# Patient Record
Sex: FEMALE | Race: ASIAN | NOT HISPANIC OR LATINO
[De-identification: names, ages, dates, MRNs, and addresses within clinical notes are randomized per-mention and may not be internally consistent; named-entity substitution may affect disease eponyms.]

---

## 2022-05-23 ENCOUNTER — APPOINTMENT (OUTPATIENT)
Dept: ENDOCRINOLOGY | Facility: CLINIC | Age: 47
End: 2022-05-23
Payer: COMMERCIAL

## 2022-05-23 VITALS
HEART RATE: 104 BPM | BODY MASS INDEX: 24.11 KG/M2 | HEIGHT: 62 IN | DIASTOLIC BLOOD PRESSURE: 71 MMHG | WEIGHT: 131 LBS | SYSTOLIC BLOOD PRESSURE: 119 MMHG

## 2022-05-23 DIAGNOSIS — R53.83 OTHER FATIGUE: ICD-10-CM

## 2022-05-23 DIAGNOSIS — R55 SYNCOPE AND COLLAPSE: ICD-10-CM

## 2022-05-23 PROCEDURE — 99215 OFFICE O/P EST HI 40 MIN: CPT

## 2022-05-25 NOTE — PHYSICAL EXAM
[Alert] : alert [Normal Sclera/Conjunctiva] : normal sclera/conjunctiva [Normal Outer Ear/Nose] : the ears and nose were normal in appearance [No Neck Mass] : no neck mass was observed [No Respiratory Distress] : no respiratory distress [Normal Rate] : heart rate was normal [Regular Rhythm] : with a regular rhythm [No Edema] : no peripheral edema [Spine Straight] : spine straight [No Stigmata of Cushings Syndrome] : no stigmata of Cushings Syndrome [Normal Gait] : normal gait [Normal Reflexes] : deep tendon reflexes were 2+ and symmetric [No Tremors] : no tremors [Oriented x3] : oriented to person, place, and time [Acanthosis Nigricans] : no acanthosis nigricans [de-identified] : \par Big mass palpated in the L lobe, 2.8 cm. No LN enlargement.

## 2022-05-25 NOTE — HISTORY OF PRESENT ILLNESS
[FreeTextEntry1] : Patient is a 47 year y/o F with history of thyroid nodule noted on 10/06/21, presents today to establish endocrine care.\par \par Pertinent medical history: none\par FMH: No family history of thyroid cancer, or thyroid cancer syndromes ([MEN2], familial adenomatous polyposis, or Cowden syndrome). \par \par Other PMHx: DM, Migraine, Peptic ulcer disease, anemia, stroke or TIA\par PSHx: Appendectomy, Oophorectomy (one removed)\par FHx: Mother: lump in throat, DM, bleeding disorder, Ovarian CA. Father: DM, bleeding disorder, Glaucoma, Kidney disease. Grandmother (maternal): thyroid condition (most likely hypothyroidism). Has 2 siblings in good health. No FHx of: thyroid nodules/CA. \par SHx: Never smoker. No EtOH use. No recreational drug use. 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 \par Allergic to: Opioids (vomiting and malaise)\par LMP: 2 weeks ago (noted 05/23/2022). \par \par 05/23/2022\par Pt is accompanied by her friend for thyroid evaluation. \par Thyroid nodule was noted on imaging studies workup after pt lost consciousness at work on 10/06/21. So far, pt was informed she had TIA . She never had her thyroid checked by an endocrinologist due to delay in insurance (insurance was approved 2 weeks ago).\par \par As per pt, she completed a CT scan of the nodule at [facility] (no available imaging at present). \par \par Today, pt feels tired, onset 2021. She endorses joint pains and chronic diarrhea 3-4 times a week since childhood (pt was diagnosed with peptic ulcer for diarrhea). Pt also experiences occasional heat intolerance. \par Denies obstructive respiratory symptoms, nocturia, hair loss, or acne. \par \par Current Medication: ASA 81 mg (ER due to TIA), Pantoprazole 40 mg prn, Tylenol 500 mg prn, Cetirizine 30 mg prn, Pseudoephedrine 30 mg prn (for congestion). \par

## 2022-05-25 NOTE — REVIEW OF SYSTEMS
[Negative] : Heme/Lymph [Fatigue] : fatigue [Diarrhea] : diarrhea [Heat Intolerance] : heat intolerance [As Noted in HPI] : as noted in HPI [Joint Pain] : joint pain [Dysphagia] : no dysphagia [Dysphonia] : no dysphonia [Difficulty Breathing] : no dyspnea [Nocturia] : no nocturia [Acne] : no acne [Hair Loss] : no hair loss

## 2022-05-25 NOTE — END OF VISIT
[FreeTextEntry3] : All medical record entries made by the Scribe were at my, Dr. Franki Wynn, direction and personally dictated by me on 05/23/2022. I have reviewed the chart and agree that the record accurately reflects my personal performance of the history, physical exam, assessment and plan. I have also personally directed, reviewed and agreed with the chart.  [Time Spent: ___ minutes] : I have spent [unfilled] minutes of time on the encounter.

## 2022-05-25 NOTE — ADDENDUM
[FreeTextEntry1] : I Carly Jossue act soley as a scribe for Dr. Franki Wynn on this date. 05/23/2022

## 2022-05-25 NOTE — ASSESSMENT
[FreeTextEntry1] : 47 year y/o F pt with:\par \par 1. L Thyroid nodule noted on imaging studies workup after fainting episode at work on 10/2021:\par Pt denies obstructive respiratory symptoms. \par No signs of hyper or hypothyroidism. \par No family history of thyroid nodules/CA. \par Order TSH and thyroid US.\par \par 2. Syncope episodes found on 10/2021:\par Pt was admitted and was told she might have TIA/stroke. \par Workup has not been completed because she did not have insurance at the times. \par Complains of feeling extremely tired, onset 1 year. \par Refer to PCP. \par \par Return in: 4 weeks. \par

## 2022-08-10 ENCOUNTER — APPOINTMENT (OUTPATIENT)
Dept: INTERNAL MEDICINE | Facility: CLINIC | Age: 47
End: 2022-08-10

## 2022-08-10 ENCOUNTER — LABORATORY RESULT (OUTPATIENT)
Age: 47
End: 2022-08-10

## 2022-08-10 VITALS
SYSTOLIC BLOOD PRESSURE: 113 MMHG | BODY MASS INDEX: 24.29 KG/M2 | HEIGHT: 62 IN | DIASTOLIC BLOOD PRESSURE: 69 MMHG | OXYGEN SATURATION: 97 % | TEMPERATURE: 98.7 F | WEIGHT: 132 LBS | HEART RATE: 108 BPM

## 2022-08-10 DIAGNOSIS — G47.00 INSOMNIA, UNSPECIFIED: ICD-10-CM

## 2022-08-10 DIAGNOSIS — Z86.73 PERSONAL HISTORY OF TRANSIENT ISCHEMIC ATTACK (TIA), AND CEREBRAL INFARCTION W/OUT RESIDUAL DEFICITS: ICD-10-CM

## 2022-08-10 PROCEDURE — 36415 COLL VENOUS BLD VENIPUNCTURE: CPT

## 2022-08-10 PROCEDURE — 99386 PREV VISIT NEW AGE 40-64: CPT | Mod: 25

## 2022-08-10 RX ORDER — MONTELUKAST 10 MG/1
10 TABLET, FILM COATED ORAL
Qty: 30 | Refills: 0 | Status: COMPLETED | COMMUNITY
Start: 2022-07-13 | End: 2022-08-10

## 2022-08-10 RX ORDER — LEVOFLOXACIN 250 MG/1
250 TABLET, FILM COATED ORAL
Qty: 20 | Refills: 0 | Status: COMPLETED | COMMUNITY
Start: 2022-04-27

## 2022-08-10 NOTE — HISTORY OF PRESENT ILLNESS
[FreeTextEntry1] : annual [de-identified] : acid reflux\par - has had a few EGDs in the past\par - has tried multiple OTC antacids\par - only PPI works well\par \par TIA 10/2021\par - follows w/ outside neuro\par - had MRI w/ and w/o last week; pending results.\par \par Thyroid nodule\par - follows w/ endo\par - needs labs drawn today\par \par ride sided low back pain\par - radiates down leg\par - follows w/ acupuncturist\par - has never had xray images\par - improves w/ gentle stretching. \par \par HCM\par - never had mammo\par - flu and covid vax up to date.\par - needs new GYN

## 2022-08-15 ENCOUNTER — TRANSCRIPTION ENCOUNTER (OUTPATIENT)
Age: 47
End: 2022-08-15

## 2022-08-15 LAB
25(OH)D3 SERPL-MCNC: 15.2 NG/ML
ALBUMIN SERPL ELPH-MCNC: 4.3 G/DL
ALP BLD-CCNC: 77 U/L
ALT SERPL-CCNC: 15 U/L
ANION GAP SERPL CALC-SCNC: 14 MMOL/L
APPEARANCE: CLEAR
AST SERPL-CCNC: 23 U/L
BASOPHILS # BLD AUTO: 0.08 K/UL
BASOPHILS NFR BLD AUTO: 1.3 %
BILIRUB SERPL-MCNC: 0.3 MG/DL
BILIRUBIN URINE: NEGATIVE
BLOOD URINE: NORMAL
BUN SERPL-MCNC: 9 MG/DL
CALCIUM SERPL-MCNC: 9.9 MG/DL
CHLORIDE SERPL-SCNC: 101 MMOL/L
CHOLEST SERPL-MCNC: 153 MG/DL
CO2 SERPL-SCNC: 22 MMOL/L
COLOR: NORMAL
CREAT SERPL-MCNC: 0.62 MG/DL
EGFR: 112 ML/MIN/1.73M2
EOSINOPHIL # BLD AUTO: 0.25 K/UL
EOSINOPHIL NFR BLD AUTO: 4.1 %
ESTIMATED AVERAGE GLUCOSE: 117 MG/DL
GLUCOSE QUALITATIVE U: NEGATIVE
GLUCOSE SERPL-MCNC: 94 MG/DL
HBA1C MFR BLD HPLC: 5.7 %
HCT VFR BLD CALC: 42 %
HCV AB SER QL: NONREACTIVE
HCV S/CO RATIO: 0.09 S/CO
HDLC SERPL-MCNC: 57 MG/DL
HGB BLD-MCNC: 13.3 G/DL
IMM GRANULOCYTES NFR BLD AUTO: 0.2 %
KETONES URINE: NEGATIVE
LDLC SERPL CALC-MCNC: 67 MG/DL
LEUKOCYTE ESTERASE URINE: NEGATIVE
LYMPHOCYTES # BLD AUTO: 1.76 K/UL
LYMPHOCYTES NFR BLD AUTO: 29.2 %
MAN DIFF?: NORMAL
MCHC RBC-ENTMCNC: 26.4 PG
MCHC RBC-ENTMCNC: 31.7 GM/DL
MCV RBC AUTO: 83.3 FL
MONOCYTES # BLD AUTO: 0.49 K/UL
MONOCYTES NFR BLD AUTO: 8.1 %
NEUTROPHILS # BLD AUTO: 3.44 K/UL
NEUTROPHILS NFR BLD AUTO: 57.1 %
NITRITE URINE: NEGATIVE
NONHDLC SERPL-MCNC: 96 MG/DL
PH URINE: 7.5
PLATELET # BLD AUTO: 271 K/UL
POTASSIUM SERPL-SCNC: 4.4 MMOL/L
PROT SERPL-MCNC: 7.3 G/DL
PROTEIN URINE: NEGATIVE
RBC # BLD: 5.04 M/UL
RBC # FLD: 14.3 %
SODIUM SERPL-SCNC: 137 MMOL/L
SPECIFIC GRAVITY URINE: 1.01
T4 FREE SERPL-MCNC: 1.2 NG/DL
THYROGLOB AB SERPL-ACNC: <20 IU/ML
THYROPEROXIDASE AB SERPL IA-ACNC: <10 IU/ML
TRIGL SERPL-MCNC: 145 MG/DL
TSH SERPL-ACNC: 2.23 UIU/ML
UROBILINOGEN URINE: NORMAL
WBC # FLD AUTO: 6.03 K/UL

## 2022-08-16 ENCOUNTER — TRANSCRIPTION ENCOUNTER (OUTPATIENT)
Age: 47
End: 2022-08-16

## 2022-08-18 ENCOUNTER — APPOINTMENT (OUTPATIENT)
Dept: ENDOCRINOLOGY | Facility: CLINIC | Age: 47
End: 2022-08-18

## 2022-08-18 VITALS
SYSTOLIC BLOOD PRESSURE: 120 MMHG | HEART RATE: 104 BPM | BODY MASS INDEX: 24.51 KG/M2 | WEIGHT: 134 LBS | DIASTOLIC BLOOD PRESSURE: 75 MMHG

## 2022-08-18 PROCEDURE — 99213 OFFICE O/P EST LOW 20 MIN: CPT

## 2022-08-19 NOTE — HISTORY OF PRESENT ILLNESS
[FreeTextEntry1] : Patient is a 47 year y/o F with history of thyroid nodule noted on 10/06/21, presents today to establish endocrine care.\par \par Pertinent medical history: none\par FMH: No family history of thyroid cancer, or thyroid cancer syndromes ([MEN2], familial adenomatous polyposis, or Cowden syndrome). \par \par Other PMHx: DM, Migraine, Peptic ulcer disease, anemia, stroke or TIA\par PSHx: Appendectomy, Oophorectomy (one removed)\par FHx: Mother: lump in throat, DM, bleeding disorder, Ovarian CA. Father: DM, bleeding disorder, Glaucoma, Kidney disease. Grandmother (maternal): thyroid condition (most likely hypothyroidism). Has 2 siblings in good health. No FHx of: thyroid nodules/CA. \par SHx: Never smoker. No EtOH use. No recreational drug use. 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 \par Allergic to: Opioids (vomiting and malaise)\par LMP: 2 weeks ago (noted 05/23/2022). \par \par 05/23/2022\par Pt is accompanied by her friend for thyroid evaluation. \par Thyroid nodule was noted on imaging studies workup after pt lost consciousness at work on 10/06/21. So far, pt was informed she had TIA . She never had her thyroid checked by an endocrinologist due to delay in insurance (insurance was approved 2 weeks ago).\par \par As per pt, she completed a CT scan of the nodule at [facility] (no available imaging at present). \par \par Today, pt feels tired, onset 2021. She endorses joint pains and chronic diarrhea 3-4 times a week since childhood (pt was diagnosed with peptic ulcer for diarrhea). Pt also experiences occasional heat intolerance. \par Denies obstructive respiratory symptoms, nocturia, hair loss, or acne. \par \par 08/18/2022\par Pt presents today for thyroid f/u, she is clinically unchanged from last visit. She sees her neurologist for c/o dizziness which occurs when she moves very fast. She sees her PCP regularly.   \par Pt was explained her thyroid labs and US.  \par She is not taking any medications.  \par \par Current Medication: ASA 81 mg (ER due to TIA), Pantoprazole 40 mg prn, Tylenol 500 mg prn, Cetirizine 30 mg prn, Pseudoephedrine 30 mg prn (for congestion). \par

## 2022-08-19 NOTE — ADDENDUM
[FreeTextEntry1] : I, Katiuska Ernst, acted solely as a scribe for Dr. Franki Wynn on this date 08/18/2022  [Follow-Up Visit] : a follow-up [FreeTextEntry2] : MPD/thrombocytosis, breast cancer

## 2022-08-19 NOTE — DATA REVIEWED
[FreeTextEntry1] : Imaging: \par \par Labs: \par - 08/10/22: A1c 5.7%, s.creat 0.62, Ca 9.9, LDL-c 67, TSHx 2.23, Free T4 1.2, Vitamin D 15.2 (L), TPA <10, Thyro Ab <20 \par

## 2022-08-19 NOTE — REVIEW OF SYSTEMS
[As Noted in HPI] : as noted in HPI [Dizziness] : dizziness [Negative] : Endocrine [Difficulty Breathing] : no dyspnea [Nocturia] : no nocturia Referred To Plastics For Closure Text (Leave Blank If You Do Not Want): After obtaining clear surgical margins the patient was sent to plastics for surgical repair.  The patient understands they will receive post-surgical care and follow-up from the referring physician's office.

## 2022-08-19 NOTE — END OF VISIT
[FreeTextEntry3] : All medical record entries made by the Scribe were at my, Dr. Franki Wynn, direction and personally dictated by me on 08/18/2022. I have reviewed the chart and agree that the record accurately reflects my personal performance of the history, physical exam, assessment and plan. I have also personally, directed, reviewed and agreed with the chart  [Time Spent: ___ minutes] : I have spent [unfilled] minutes of time on the encounter.

## 2022-08-19 NOTE — PHYSICAL EXAM
[Alert] : alert [Normal Sclera/Conjunctiva] : normal sclera/conjunctiva [Normal Outer Ear/Nose] : the ears and nose were normal in appearance [No Neck Mass] : no neck mass was observed [No Respiratory Distress] : no respiratory distress [Normal Rate] : heart rate was normal [Regular Rhythm] : with a regular rhythm [No Edema] : no peripheral edema [Spine Straight] : spine straight [No Stigmata of Cushings Syndrome] : no stigmata of Cushings Syndrome [Normal Gait] : normal gait [Normal Reflexes] : deep tendon reflexes were 2+ and symmetric [No Tremors] : no tremors [Oriented x3] : oriented to person, place, and time [Acanthosis Nigricans] : no acanthosis nigricans [de-identified] : palpapble nodule 4 cm in L side that is soft and moblile.  [de-identified] : Palpable nodule 4 cm in L side that is soft and mobile. \par Big mass palpated in the L lobe, 2.8 cm. No LN enlargement.

## 2022-08-19 NOTE — ASSESSMENT
[FreeTextEntry1] : 47 year y/o F pt with:\par \par 1. L Thyroid nodule noted on imaging studies workup after fainting episode at work on 10/2021:\par No family history of thyroid Ca. Pt is asymptomatic with no obstructive symptoms. \par Thyroid US from 06/04/21: Right thyroid lobe: 2 subcentimeter cystic nodules, and a third hypoechoic nodule. \par                                            Left thyroid lobe: A mixed cyst solid module that measure 5.2 x 2.4 x 3.2 cm. \par \par Will repeat Thyroid US in 12/22. \par Pt will return in 12/22. \par \par

## 2022-09-13 ENCOUNTER — TRANSCRIPTION ENCOUNTER (OUTPATIENT)
Age: 47
End: 2022-09-13

## 2022-09-15 ENCOUNTER — APPOINTMENT (OUTPATIENT)
Dept: INTERNAL MEDICINE | Facility: CLINIC | Age: 47
End: 2022-09-15

## 2022-09-15 ENCOUNTER — EMERGENCY (EMERGENCY)
Facility: HOSPITAL | Age: 47
LOS: 1 days | Discharge: HOME | End: 2022-09-15
Attending: STUDENT IN AN ORGANIZED HEALTH CARE EDUCATION/TRAINING PROGRAM | Admitting: STUDENT IN AN ORGANIZED HEALTH CARE EDUCATION/TRAINING PROGRAM
Payer: COMMERCIAL

## 2022-09-15 VITALS
SYSTOLIC BLOOD PRESSURE: 100 MMHG | HEART RATE: 107 BPM | WEIGHT: 132 LBS | DIASTOLIC BLOOD PRESSURE: 64 MMHG | BODY MASS INDEX: 24.29 KG/M2 | HEIGHT: 62 IN | OXYGEN SATURATION: 97 % | TEMPERATURE: 98.6 F | RESPIRATION RATE: 16 BRPM

## 2022-09-15 VITALS
TEMPERATURE: 98 F | HEART RATE: 86 BPM | SYSTOLIC BLOOD PRESSURE: 116 MMHG | OXYGEN SATURATION: 100 % | WEIGHT: 134.04 LBS | DIASTOLIC BLOOD PRESSURE: 77 MMHG | RESPIRATION RATE: 16 BRPM | HEIGHT: 62 IN

## 2022-09-15 DIAGNOSIS — M54.41 LUMBAGO WITH SCIATICA, RIGHT SIDE: ICD-10-CM

## 2022-09-15 DIAGNOSIS — M25.461 EFFUSION, RIGHT KNEE: ICD-10-CM

## 2022-09-15 DIAGNOSIS — E11.9 TYPE 2 DIABETES MELLITUS WITHOUT COMPLICATIONS: ICD-10-CM

## 2022-09-15 DIAGNOSIS — M25.569 PAIN IN UNSPECIFIED KNEE: ICD-10-CM

## 2022-09-15 DIAGNOSIS — Z88.5 ALLERGY STATUS TO NARCOTIC AGENT: ICD-10-CM

## 2022-09-15 DIAGNOSIS — M79.10 MYALGIA, UNSPECIFIED SITE: ICD-10-CM

## 2022-09-15 DIAGNOSIS — M25.561 PAIN IN RIGHT KNEE: ICD-10-CM

## 2022-09-15 PROCEDURE — 99215 OFFICE O/P EST HI 40 MIN: CPT

## 2022-09-15 PROCEDURE — 99284 EMERGENCY DEPT VISIT MOD MDM: CPT

## 2022-09-15 RX ORDER — ACETAMINOPHEN 500 MG
650 TABLET ORAL ONCE
Refills: 0 | Status: COMPLETED | OUTPATIENT
Start: 2022-09-15 | End: 2022-09-15

## 2022-09-15 NOTE — ED PROVIDER NOTE - OBJECTIVE STATEMENT
47F with hx DM (not active?), now with 2 months of gradual onset relapsing/remitting R posterior knee pain and swelling, with pain on movement, sent by PCP for r/o DVT, no history of fall or trauma to leg.

## 2022-09-15 NOTE — ED PROVIDER NOTE - PATIENT PORTAL LINK FT
You can access the FollowMyHealth Patient Portal offered by [facility] by registering at the following website: [email or web address]. By joining [facility]’s FollowMyHealth portal, you will also be able to view your health information using other applications (apps) compatible with our system.

## 2022-09-15 NOTE — ED ADULT NURSE NOTE - OBJECTIVE STATEMENT
Pt presents to ED C/O RLE swelling, tenderness that is warm to touch. Denies fevers, CP, SOB. Denies hx of DVT. Sent by PCP for R/O DVT. Pt ambulating well independently.

## 2022-09-15 NOTE — ED PROVIDER NOTE - PROGRESS NOTE DETAILS
no DVT on US. recommend f/u with PMD  I have discussed the discharge plan with the patient. The patient agrees with the plan, as discussed.  The patient understands Emergency Department diagnosis is a preliminary diagnosis often based on limited information and that the patient must adhere to the follow-up plan as discussed.  The patient understands that if the symptoms worsen  the patient may return to the Emergency Department at any time for further evaluation and treatment.

## 2022-09-15 NOTE — ED PROVIDER NOTE - NSFOLLOWUPINSTRUCTIONS_ED_ALL_ED_FT
Please follow up with your PMD next week for re-evaluation.          LEG PAIN - AfterCare(R) Instructions(ER/ED)           Leg Pain    WHAT YOU NEED TO KNOW:    Leg pain may be caused by a variety of health conditions. Your tests did not show any broken bones or blood clots.    DISCHARGE INSTRUCTIONS:    Return to the emergency department if:   •You have a fever.      •Your leg starts to swell.      •Your leg pain gets worse.      •You have numbness or tingling in your leg or toes.      •You cannot put any weight on or move your leg.      Contact your healthcare provider if:   •Your pain does not decrease, even after treatment.      •You have questions or concerns about your condition or care.      Medicines:   •NSAIDs, such as ibuprofen, help decrease swelling, pain, and fever. This medicine is available with or without a doctor's order. NSAIDs can cause stomach bleeding or kidney problems in certain people. If you take blood thinner medicine, always ask your healthcare provider if NSAIDs are safe for you. Always read the medicine label and follow directions.      •Take your medicine as directed. Contact your healthcare provider if you think your medicine is not helping or if you have side effects. Tell your provider if you are allergic to any medicine. Keep a list of the medicines, vitamins, and herbs you take. Include the amounts, and when and why you take them. Bring the list or the pill bottles to follow-up visits. Carry your medicine list with you in case of an emergency.      Follow up with your healthcare provider as directed: You may need more tests to find the cause of your leg pain. You may need to see an orthopedic specialist or a physical therapist. Write down your questions so you remember to ask them during your visits.    Manage your leg pain:   •Rest your injured leg so that it can heal. You may need an immobilizer, brace, or splint to limit the movement of your leg. You may need to avoid putting any weight on your leg for at least 48 hours. Return to normal activities as directed.      •Ice the injury for 20 minutes every 4 hours for up to 24 hours, or as directed. Use an ice pack, or put crushed ice in a plastic bag. Cover it with a towel to protect your skin. Ice helps prevent tissue damage and decreases swelling and pain.      •Elevate your injured leg above the level of your heart as often as you can. This will help decrease swelling and pain. If possible, prop your leg on pillows or blankets to keep the area elevated comfortably.       •Use assistive devices as directed. You may need to use a cane or crutches. Assistive devices help decrease pain and pressure on your leg when you walk. Ask your healthcare provider for more information about assistive devices and how to use them correctly.      •Maintain a healthy weight. Extra body weight can cause pressure and pain in your hip, knee, and ankle joints. Ask your healthcare provider how much you should weigh. Ask him to help you create a weight loss plan if you are overweight.                     Log Out.      [employer] CareNotes®     :  [facility]  	                     LEG EDEMA - AfterCare(R) Instructions(ER/ED)           Leg Edema    WHAT YOU NEED TO KNOW:    Leg edema is swelling caused by fluid buildup. Your legs may swell if you sit or stand for long periods of time, are pregnant, or are injured. Swelling may also occur if you have heart failure or circulation problems. This means that your heart does not pump blood through your body as it should.  Lower Leg Edema         DISCHARGE INSTRUCTIONS:    Call your local emergency number (911 in the [place]) for any of the following:   •You cannot walk.      •You have chest pain or trouble breathing that is worse when you lie down.      •You suddenly feel lightheaded and have trouble breathing.      •You have new and sudden chest pain. You may have more pain when you take deep breaths or cough.      •You cough up blood.      Return to the emergency department if:   •You feel faint or confused.      •Your skin turns blue or gray.      •Your leg feels warm, tender, and painful. It may be swollen and red.      Call your doctor if:   •You have a fever or feel more tired than usual.      •The veins in your legs look larger than usual. They may look full or bulging.      •Your legs itch or feel heavy.      •You have red or white areas or sores on your legs. The skin may also appear dimpled or have indentations.      •You are gaining weight.      •You have trouble moving your ankles.      •The swelling does not go away, or other parts of your body swell.      •You have questions or concerns about your condition or care.      Self-care:   •Elevate your legs. Raise your legs above the level of your heart as often as you can. This will help decrease swelling and pain. Prop your legs on pillows or blankets to keep them elevated comfortably.  Elevate Leg           •Wear pressure stockings, if directed. These tight stockings put pressure on your legs to promote blood flow and prevent blood clots. Put them on before you get out of bed. Wear the stockings during the day. Do not wear them while you sleep.  Pressure Stockings            •Stay active. Do not stand or sit for long periods of time. Ask your healthcare provider about the best exercise plan for you.  Walking for Exercise           •Eat healthy foods. Healthy foods include fruits, vegetables, whole-grain breads, low-fat dairy products, beans, lean meats, and fish. Ask if you need to be on a special diet.  Healthy Foods           •Limit sodium (salt). Salt will make your body hold even more fluid. Your healthcare provider will tell you how many milligrams (mg) of salt you can have each day.             Follow up with your doctor as directed: Write down your questions so you remember to ask them during your visits. Please follow up with your PMD next week for re-evaluation.     Leg Pain    WHAT YOU NEED TO KNOW:    Leg pain may be caused by a variety of health conditions. Your tests did not show any broken bones or blood clots.    DISCHARGE INSTRUCTIONS:    Return to the emergency department if:   •You have a fever.    •Your leg starts to swell.    •Your leg pain gets worse.    •You have numbness or tingling in your leg or toes.    •You cannot put any weight on or move your leg.    Contact your healthcare provider if:   •Your pain does not decrease, even after treatment.    •You have questions or concerns about your condition or care.    Medicines:   •NSAIDs, such as ibuprofen, help decrease swelling, pain, and fever. This medicine is available with or without a doctor's order. NSAIDs can cause stomach bleeding or kidney problems in certain people. If you take blood thinner medicine, always ask your healthcare provider if NSAIDs are safe for you. Always read the medicine label and follow directions.    •Take your medicine as directed. Contact your healthcare provider if you think your medicine is not helping or if you have side effects. Tell your provider if you are allergic to any medicine. Keep a list of the medicines, vitamins, and herbs you take. Include the amounts, and when and why you take them. Bring the list or the pill bottles to follow-up visits. Carry your medicine list with you in case of an emergency.    Follow up with your healthcare provider as directed: You may need more tests to find the cause of your leg pain. You may need to see an orthopedic specialist or a physical therapist. Write down your questions so you remember to ask them during your visits.    Manage your leg pain:   •Rest your injured leg so that it can heal. You may need an immobilizer, brace, or splint to limit the movement of your leg. You may need to avoid putting any weight on your leg for at least 48 hours. Return to normal activities as directed.    •Ice the injury for 20 minutes every 4 hours for up to 24 hours, or as directed. Use an ice pack, or put crushed ice in a plastic bag. Cover it with a towel to protect your skin. Ice helps prevent tissue damage and decreases swelling and pain.      •Elevate your injured leg above the level of your heart as often as you can. This will help decrease swelling and pain. If possible, prop your leg on pillows or blankets to keep the area elevated comfortably.     •Use assistive devices as directed. You may need to use a cane or crutches. Assistive devices help decrease pain and pressure on your leg when you walk. Ask your healthcare provider for more information about assistive devices and how to use them correctly.    •Maintain a healthy weight. Extra body weight can cause pressure and pain in your hip, knee, and ankle joints. Ask your healthcare provider how much you should weigh. Ask him to help you create a weight loss plan if you are overweight.      Leg Edema    WHAT YOU NEED TO KNOW:    Leg edema is swelling caused by fluid buildup. Your legs may swell if you sit or stand for long periods of time, are pregnant, or are injured. Swelling may also occur if you have heart failure or circulation problems. This means that your heart does not pump blood through your body as it should.    DISCHARGE INSTRUCTIONS:    Call your local emergency number (911 in the ) for any of the following:   •You cannot walk.    •You have chest pain or trouble breathing that is worse when you lie down.    •You suddenly feel lightheaded and have trouble breathing.    •You have new and sudden chest pain. You may have more pain when you take deep breaths or cough.    •You cough up blood.    Return to the emergency department if:   •You feel faint or confused.    •Your skin turns blue or gray.    •Your leg feels warm, tender, and painful. It may be swollen and red.    Call your doctor if:   •You have a fever or feel more tired than usual.    •The veins in your legs look larger than usual. They may look full or bulging.    •Your legs itch or feel heavy.    •You have red or white areas or sores on your legs. The skin may also appear dimpled or have indentations.    •You are gaining weight.    •You have trouble moving your ankles.    •The swelling does not go away, or other parts of your body swell.    •You have questions or concerns about your condition or care.    Self-care:   •Elevate your legs. Raise your legs above the level of your heart as often as you can. This will help decrease swelling and pain. Prop your legs on pillows or blankets to keep them elevated comfortably.    •Wear pressure stockings, if directed. These tight stockings put pressure on your legs to promote blood flow and prevent blood clots. Put them on before you get out of bed. Wear the stockings during the day. Do not wear them while you sleep.    •Stay active. Do not stand or sit for long periods of time. Ask your healthcare provider about the best exercise plan for you.    •Eat healthy foods. Healthy foods include fruits, vegetables, whole-grain breads, low-fat dairy products, beans, lean meats, and fish. Ask if you need to be on a special diet.    •Limit sodium (salt). Salt will make your body hold even more fluid. Your healthcare provider will tell you how many milligrams (mg) of salt you can have each day.    Follow up with your doctor as directed: Write down your questions so you remember to ask them during your visits.

## 2022-09-15 NOTE — ED PROVIDER NOTE - CLINICAL SUMMARY MEDICAL DECISION MAKING FREE TEXT BOX
Concern for longstanding R leg pain and swelling, possibly musculoskeletal.  Will r/o DVT via US.  Given no trauma and ambulatory, concern for fracture as cause for pain below threshold for XR.  No sign of cellulitis or deep tissue infection of leg.  No sign of distal neurovascular compromise.

## 2022-09-15 NOTE — ED PROVIDER NOTE - NS ED ROS FT
REVIEW OF SYSTEMS  positive for: myalgias  negative for: fever, headache, eye pain, runny nose, sore throat, cough, shortness of breath, chest pain, stomach pain, vomiting, diarrhea, dysuria, rash

## 2022-09-15 NOTE — ED PROVIDER NOTE - SHIFT CHANGE DETAILS
47F with R posterior calf pain and swelling, atraumatic, no signs of infection, pending ultrasound R leg r/o DVT.

## 2022-09-15 NOTE — ED PROVIDER NOTE - PHYSICAL EXAMINATION
General: comfortable, resting in ED  HEENT: atraumatic, no eye erythema or discharge  Pulm: no cyanosis, no added work of breathing  Cardiac: extremities warm, intact peripheral pulses including 2+ R DP  GI: no abdominal distension, abdomen nontender  Neuro: alert, conversant, intact sensation R medial and lateral foot  Psych: neutral affect, cooperative  Msk: no gross deformity or instability, R calf tenderness to palpation with no pitting edema  Skin: no erythema or rash over R leg

## 2022-09-16 ENCOUNTER — TRANSCRIPTION ENCOUNTER (OUTPATIENT)
Age: 47
End: 2022-09-16

## 2022-09-16 PROBLEM — M25.569 POSTERIOR KNEE PAIN: Status: ACTIVE | Noted: 2022-09-15

## 2022-09-16 PROCEDURE — 93971 EXTREMITY STUDY: CPT

## 2022-09-16 PROCEDURE — 99284 EMERGENCY DEPT VISIT MOD MDM: CPT | Mod: 25

## 2022-09-16 PROCEDURE — 93971 EXTREMITY STUDY: CPT | Mod: 26,RT

## 2022-09-16 NOTE — HISTORY OF PRESENT ILLNESS
[FreeTextEntry8] : Pt is a 48 y/o F with PMHx of R lower back pain with sciatica who presents to the office today for evaluation of leg pain.\par \par RLE pain:\par - Pt states that she has R sided LE pain in that originates in the posterior knee and radiates to the heel.  This began end of August.  \par - Pt was seen by her acupuncturist/ PT 9/4/22 who was concerned for a blood clot - leg was swollen, red, and painful behind the knee. pt expressed this to our office the following week and she was informed to get ED eval but pt did not go.  \par - Currently pain is 7/10 in severity. Described as a cramping and tingling.  Worse with walking, laying down flat, and flexion or extension. Pt states that stretching helps the pain. But it is very difficult to walk and get up from a seated to standing position.\par - She has been seeing her acupunctures/ PT since July 2022. Reports has been helps in the moment but pain has been getting progressively worse.

## 2022-09-16 NOTE — PHYSICAL EXAM
[No Edema] : there was no peripheral edema [Normal] : affect was normal and insight and judgment were intact [de-identified] : cap refill < 2  sec upper and lower extremities [de-identified] : + significant tenderness R popliteal fossa down to ankle, pain with flexion and extension of R knee, + linh's sign R LE

## 2022-09-20 PROBLEM — Z78.9 OTHER SPECIFIED HEALTH STATUS: Chronic | Status: ACTIVE | Noted: 2022-09-15

## 2022-10-10 ENCOUNTER — TRANSCRIPTION ENCOUNTER (OUTPATIENT)
Age: 47
End: 2022-10-10

## 2022-10-12 ENCOUNTER — TRANSCRIPTION ENCOUNTER (OUTPATIENT)
Age: 47
End: 2022-10-12

## 2022-10-17 ENCOUNTER — APPOINTMENT (OUTPATIENT)
Dept: OBGYN | Facility: CLINIC | Age: 47
End: 2022-10-17

## 2022-10-17 VITALS
WEIGHT: 136 LBS | DIASTOLIC BLOOD PRESSURE: 82 MMHG | SYSTOLIC BLOOD PRESSURE: 118 MMHG | BODY MASS INDEX: 25.03 KG/M2 | HEIGHT: 62 IN

## 2022-10-17 DIAGNOSIS — N93.9 ABNORMAL UTERINE AND VAGINAL BLEEDING, UNSPECIFIED: ICD-10-CM

## 2022-10-17 DIAGNOSIS — N95.1 MENOPAUSAL AND FEMALE CLIMACTERIC STATES: ICD-10-CM

## 2022-10-17 PROCEDURE — 99213 OFFICE O/P EST LOW 20 MIN: CPT

## 2022-10-19 NOTE — PLAN
[FreeTextEntry1] : usg; hormone panel\par rtn after sono;\par pt reports having pain with exams/penetration

## 2022-10-19 NOTE — HISTORY OF PRESENT ILLNESS
[Patient reported PAP Smear was normal] : Patient reported PAP Smear was normal [PapSmeardate] : 2018 [Yes] : Patient has concerns regarding sex [Previously active] : previously active [Women] : women [FreeTextEntry1] : having pain with penetration

## 2022-11-11 LAB
ESTRADIOL SERPL-MCNC: 9 PG/ML
FSH SERPL-MCNC: 21.5 IU/L
LH SERPL-ACNC: 9.4 IU/L

## 2022-11-15 ENCOUNTER — TRANSCRIPTION ENCOUNTER (OUTPATIENT)
Age: 47
End: 2022-11-15

## 2022-11-16 ENCOUNTER — TRANSCRIPTION ENCOUNTER (OUTPATIENT)
Age: 47
End: 2022-11-16

## 2022-12-07 ENCOUNTER — TRANSCRIPTION ENCOUNTER (OUTPATIENT)
Age: 47
End: 2022-12-07

## 2022-12-08 ENCOUNTER — OUTPATIENT (OUTPATIENT)
Dept: OUTPATIENT SERVICES | Facility: HOSPITAL | Age: 47
LOS: 1 days | End: 2022-12-08
Payer: COMMERCIAL

## 2022-12-08 ENCOUNTER — APPOINTMENT (OUTPATIENT)
Dept: ULTRASOUND IMAGING | Facility: HOSPITAL | Age: 47
End: 2022-12-08

## 2022-12-08 PROCEDURE — 76536 US EXAM OF HEAD AND NECK: CPT

## 2022-12-08 PROCEDURE — 76536 US EXAM OF HEAD AND NECK: CPT | Mod: 26

## 2022-12-12 ENCOUNTER — APPOINTMENT (OUTPATIENT)
Dept: HEART AND VASCULAR | Facility: CLINIC | Age: 47
End: 2022-12-12

## 2022-12-12 ENCOUNTER — NON-APPOINTMENT (OUTPATIENT)
Age: 47
End: 2022-12-12

## 2022-12-12 VITALS
DIASTOLIC BLOOD PRESSURE: 77 MMHG | OXYGEN SATURATION: 96 % | BODY MASS INDEX: 24.66 KG/M2 | SYSTOLIC BLOOD PRESSURE: 114 MMHG | TEMPERATURE: 97.3 F | HEIGHT: 62 IN | WEIGHT: 134 LBS | HEART RATE: 96 BPM

## 2022-12-12 PROCEDURE — 99205 OFFICE O/P NEW HI 60 MIN: CPT | Mod: 25

## 2022-12-12 PROCEDURE — 93000 ELECTROCARDIOGRAM COMPLETE: CPT

## 2022-12-12 NOTE — HISTORY OF PRESENT ILLNESS
[FreeTextEntry1] : \par \par 48 y/o female with h/o migraine, tia/syncope, predm, gerd who presents for initial evaluation today.\par \par no cp, sob, lh, palpitations, edema, orthopnea, pnd, syncope\par \par had loc episode at work 10/2021 - bent over to get something - had LOC - felt spinning\par taken to ER - had brain imaging - told she had TIA\par has not seen neuro as outpatient yet\par h/o migraines\par \par walks for exercise\par \par PMH/PSH:\par ovarian cyst\par tia/syncope\par insomnia\par thyroid nodule\par gerd\par predm\par vit D deficiency\par s/p appy\par s/p left oophorectomy\par migraine\par anemia\par \par ALL:\par nkda\par \par \par \par MEDS:\par asa 81 mg qd\par pantoprazole 40 mg prn\par cetirizine prn\par \par \par \par SH:\par no tobacco\par no etoh/drugs\par OR pharmacy tech\par from [facility]\par no children\par \par lives w roomate\par \par \par FH:\par mother -dm, alive\par father - dm, alive\par 2 siblings - alive, healthy\par \par

## 2022-12-12 NOTE — DISCUSSION/SUMMARY
[Patient] : the patient [EKG obtained to assist in diagnosis and management of assessed problem(s)] : EKG obtained to assist in diagnosis and management of assessed problem(s) [___ Month(s)] : in [unfilled] month(s) [FreeTextEntry1] : 46 y/o female with h/o migraine, tia/syncope, predm, gerd who presents for initial evaluation today.\par \par -ordered ekg - nsr, normal intervals, no st/t changes\par -labs 2022 reviewed\par -counseled on cvd risk factors\par -continue asa for now\par -close monitoring blood sugar for predm\par -ordered echo w bubble\par -neuro eval for tia/syncope, obtain prior imaging brain, may need CTA head/neck, seizure evaluation\par -calcium score for risk stratification\par -ep eval given h/o tia/syncope\par -f/up 3 months for tia/syncope\par \par I have spent 60 minutes reviewing labs, records, tests and discussed cvd risk factors, tia/syncope\par

## 2022-12-28 ENCOUNTER — APPOINTMENT (OUTPATIENT)
Dept: ENDOCRINOLOGY | Facility: CLINIC | Age: 47
End: 2022-12-28

## 2023-01-05 ENCOUNTER — APPOINTMENT (OUTPATIENT)
Dept: INTERNAL MEDICINE | Facility: CLINIC | Age: 48
End: 2023-01-05
Payer: COMMERCIAL

## 2023-01-05 VITALS
DIASTOLIC BLOOD PRESSURE: 70 MMHG | TEMPERATURE: 98.5 F | WEIGHT: 134 LBS | HEIGHT: 62 IN | SYSTOLIC BLOOD PRESSURE: 112 MMHG | HEART RATE: 103 BPM | OXYGEN SATURATION: 96 % | BODY MASS INDEX: 24.66 KG/M2

## 2023-01-05 DIAGNOSIS — N94.6 DYSMENORRHEA, UNSPECIFIED: ICD-10-CM

## 2023-01-05 DIAGNOSIS — R09.81 NASAL CONGESTION: ICD-10-CM

## 2023-01-05 PROCEDURE — 99213 OFFICE O/P EST LOW 20 MIN: CPT

## 2023-01-05 RX ORDER — PANTOPRAZOLE 40 MG/1
40 TABLET, DELAYED RELEASE ORAL DAILY
Qty: 1 | Refills: 3 | Status: COMPLETED | COMMUNITY
Start: 2022-04-27 | End: 2023-01-05

## 2023-01-05 NOTE — HISTORY OF PRESENT ILLNESS
[FreeTextEntry1] : follow up [de-identified] : Hyperacidity\par - has GI appointment scheduled \par \par abdominal bloating\par - has eval w/ another GYN 1/25/23 Dr Zhang\par \par h/o TIA\par - will see Dr. Handy 1/11/23\par - on ASA 81mg

## 2023-01-20 ENCOUNTER — TRANSCRIPTION ENCOUNTER (OUTPATIENT)
Age: 48
End: 2023-01-20

## 2023-01-20 ENCOUNTER — NON-APPOINTMENT (OUTPATIENT)
Age: 48
End: 2023-01-20

## 2023-01-20 DIAGNOSIS — S99.912A UNSPECIFIED INJURY OF LEFT ANKLE, INITIAL ENCOUNTER: ICD-10-CM

## 2023-01-23 ENCOUNTER — TRANSCRIPTION ENCOUNTER (OUTPATIENT)
Age: 48
End: 2023-01-23

## 2023-01-24 ENCOUNTER — TRANSCRIPTION ENCOUNTER (OUTPATIENT)
Age: 48
End: 2023-01-24

## 2023-01-26 ENCOUNTER — TRANSCRIPTION ENCOUNTER (OUTPATIENT)
Age: 48
End: 2023-01-26

## 2023-02-01 ENCOUNTER — APPOINTMENT (OUTPATIENT)
Dept: ORTHOPEDIC SURGERY | Facility: CLINIC | Age: 48
End: 2023-02-01
Payer: OTHER MISCELLANEOUS

## 2023-02-01 ENCOUNTER — NON-APPOINTMENT (OUTPATIENT)
Age: 48
End: 2023-02-01

## 2023-02-01 ENCOUNTER — TRANSCRIPTION ENCOUNTER (OUTPATIENT)
Age: 48
End: 2023-02-01

## 2023-02-01 VITALS — HEIGHT: 62 IN | BODY MASS INDEX: 24.29 KG/M2 | WEIGHT: 132 LBS

## 2023-02-01 PROCEDURE — 73610 X-RAY EXAM OF ANKLE: CPT | Mod: LT

## 2023-02-01 PROCEDURE — 99204 OFFICE O/P NEW MOD 45 MIN: CPT

## 2023-02-01 PROCEDURE — 99072 ADDL SUPL MATRL&STAF TM PHE: CPT

## 2023-02-02 ENCOUNTER — RESULT REVIEW (OUTPATIENT)
Age: 48
End: 2023-02-02

## 2023-02-02 ENCOUNTER — OUTPATIENT (OUTPATIENT)
Dept: OUTPATIENT SERVICES | Facility: HOSPITAL | Age: 48
LOS: 1 days | End: 2023-02-02
Payer: COMMERCIAL

## 2023-02-02 ENCOUNTER — APPOINTMENT (OUTPATIENT)
Dept: ULTRASOUND IMAGING | Facility: HOSPITAL | Age: 48
End: 2023-02-02
Payer: COMMERCIAL

## 2023-02-02 PROCEDURE — 88173 CYTOPATH EVAL FNA REPORT: CPT | Mod: 26

## 2023-02-02 PROCEDURE — 82962 GLUCOSE BLOOD TEST: CPT

## 2023-02-02 PROCEDURE — 88173 CYTOPATH EVAL FNA REPORT: CPT

## 2023-02-02 PROCEDURE — 88305 TISSUE EXAM BY PATHOLOGIST: CPT | Mod: 26

## 2023-02-02 PROCEDURE — 88305 TISSUE EXAM BY PATHOLOGIST: CPT

## 2023-02-02 PROCEDURE — 10005 FNA BX W/US GDN 1ST LES: CPT

## 2023-02-02 NOTE — HISTORY OF PRESENT ILLNESS
[de-identified] : location: left ankle\par duration: 1/19/23\par context: constant pain since ankle was directly hit from behind and dragged by trolley cart in hospital\par quality: excruciating sharp pain in Achilles; shocking pain \par aggravating factors: walking\par associated sx: bruise, pain radiating down to foot, calf weakness\par conservative tx: ACE, cane, meloxicam\par prior studies: N/A

## 2023-02-02 NOTE — ASSESSMENT
[FreeTextEntry1] : Discussed with the patient exam history and imaging consistent ankle contusion recommend activity modification rest for the next 2 weeks.  Patient informed that this may take several weeks to months to fully resolve.  She is to follow up on as needed basis\par

## 2023-02-02 NOTE — PHYSICAL EXAM
[de-identified] : Left ankle\par \par Constitutional: \par The patient is healthy-appearing and in no apparent distress. \par \par Gait and Station: \par The patient ambulates with a normal gait and no limp. \par \par Cardiovascular System: \par The capillary refill is less than 2 seconds. \par \par Skin: \par There are no skin abnormalities of ankle.\par \par Ankles and Feet: \par Inspection: \par There is no erythema.\par There is no induration.\par There is no warmth.\par There is no deformity.  \par There is no swelling. \par \par Bony Palpation: \par There is no tenderness of the calcaneal tuberosity.\par There is no tenderness of the metatarsals.\par There is no tenderness of the tarsometatarsal joints\par There is no tenderness of the navicular tuberosity. \par There is no tenderness of the dome of talus.\par There is no tenderness of the head of talus.\par There is no tenderness of the inferior tibiofibular joint.\par There is mild tenderness of the distal fibula.\par \par Soft Tissue Palpation: \par There is no tenderness of the tibialis posterior.\par There is no tenderness of the tibialis anterior. \par There is no tenderness of the plantar fascia.\par There is no tenderness of the Achilles tendon.\par There is no tenderness of the extensor hallucis longus.\par There is no tenderness of the sinus tarsi. \par There is no tenderness of the peroneus longus and brevis.\par There is no tenderness of the deltoid ligament.   \par There is no tenderness of the anterior talofibular ligament and the calcaneofibular ligament. \par \par Active Range of Motion: \par The range of motion at the ankle is full. \par \par Stability: \par The anterior drawer is negative. \par \par Strength: \par There is 5/5 ankle plantarflexion and dorsiflexion.\par \par Neurological System: \par There is normal sensation to light touch at the ankle and foot. \par \par Psychiatric: \par The patient demonstrates a normal mood and affect and is active and alert. [de-identified] : Given patient's reported history and physical examination, x-ray evaluation ( as listed below ) was ordered and performed to aid in diagnosis and treatment of the patient.\par X-ray left ankle.  There is no significant bony / soft tissue abnormality, arthritis, or fracture.\par

## 2023-02-02 NOTE — REASON FOR VISIT
[Initial Visit] : an initial visit for [Workers' Comp: Date of Injury: _______] : This visit is related to worker's compensation. Date of Injury: [unfilled] [FreeTextEntry2] : LEFT ankle injury

## 2023-02-08 ENCOUNTER — APPOINTMENT (OUTPATIENT)
Dept: HEART AND VASCULAR | Facility: CLINIC | Age: 48
End: 2023-02-08
Payer: COMMERCIAL

## 2023-02-08 ENCOUNTER — NON-APPOINTMENT (OUTPATIENT)
Age: 48
End: 2023-02-08

## 2023-02-08 VITALS
DIASTOLIC BLOOD PRESSURE: 73 MMHG | SYSTOLIC BLOOD PRESSURE: 114 MMHG | HEART RATE: 87 BPM | HEIGHT: 62 IN | WEIGHT: 131 LBS | OXYGEN SATURATION: 96 % | BODY MASS INDEX: 24.11 KG/M2

## 2023-02-08 PROCEDURE — 93306 TTE W/DOPPLER COMPLETE: CPT

## 2023-02-09 ENCOUNTER — APPOINTMENT (OUTPATIENT)
Dept: ORTHOPEDIC SURGERY | Facility: CLINIC | Age: 48
End: 2023-02-09

## 2023-02-10 ENCOUNTER — TRANSCRIPTION ENCOUNTER (OUTPATIENT)
Age: 48
End: 2023-02-10

## 2023-02-13 ENCOUNTER — NON-APPOINTMENT (OUTPATIENT)
Age: 48
End: 2023-02-13

## 2023-02-13 ENCOUNTER — APPOINTMENT (OUTPATIENT)
Dept: HEART AND VASCULAR | Facility: CLINIC | Age: 48
End: 2023-02-13
Payer: COMMERCIAL

## 2023-02-13 VITALS
BODY MASS INDEX: 24.11 KG/M2 | DIASTOLIC BLOOD PRESSURE: 76 MMHG | OXYGEN SATURATION: 97 % | SYSTOLIC BLOOD PRESSURE: 121 MMHG | HEART RATE: 97 BPM | HEIGHT: 62 IN | WEIGHT: 131 LBS

## 2023-02-13 PROCEDURE — 99204 OFFICE O/P NEW MOD 45 MIN: CPT

## 2023-02-13 PROCEDURE — 93000 ELECTROCARDIOGRAM COMPLETE: CPT

## 2023-02-14 NOTE — DISCUSSION/SUMMARY
[FreeTextEntry1] : Ms. Mcelroy is a pleasant 47 year-old female with a past medical history significant for Migraine's, Pre-DM, GERD, TIA and syncope who presents for initial evaluation.   \par \par We discussed that there are various etiologies for TIA/strokes; one of which is a heart rhythm abnormality.  We discussed the normal conduction system of the heart and what atrial fibrillation is, how it can be paroxysmal and the association with stroke.  We also spoke about how many patients are asymptomatic and therefore long-term heart rhythm monitoring is warranted to evaluate for silent atrial fibrillation.  \par \par Options for long term arrhythmia surveillance include regular EKGs, wearable technology, event monitors or a loop recorder implant which is the most thorough way to monitor this. She defers ILR placement at this time and will proceed with extended cardiac monitoring.  She was asked to return for follow-up in 6 weeks or sooner as needed.

## 2023-02-14 NOTE — CARDIOLOGY SUMMARY
[de-identified] : 2/13/23 SR @ 93 bpm  [de-identified] : TTE 2/8/2023\par 1. Left ventricular systolic function is normal with an ejection fraction visually estimated at 55 to 60%.\par 2. There is concentric remodeling.\par 3. Right ventricular systolic function is probably normal.\par 4. Agitated saline study shows no early interatrial shunt; late bubbles are noted consisted with pulmonary shunt.\par 5. Mild mitral regurgitation.\par 6. Pulmonary artery systolic pressure could not be estimated.\par 7. Trace pericardial effusion.\par 8. There are no prior studies available for comparison.\par

## 2023-02-14 NOTE — ADDENDUM
[FreeTextEntry1] : I, Michelle Salguero, hereby attest that the medical record entry for this patient accurately reflects signatures/notations that I made on the Date of Service in my capacity as an Attending Physician when I treated/diagnosed the above patient. I do hereby attest that this information is true, accurate and complete to the best of my knowledge.  I was present for the entire visit and supervised the entire visit and made/agree with the plan as outlined.\par

## 2023-02-14 NOTE — HISTORY OF PRESENT ILLNESS
[FreeTextEntry1] : Ms. Mcelroy is a pleasant 47 year-old female with a past medical history significant for Migraine's, Pre-DM, GERD, TIA and syncope who presents for initial evaluation.   \par \par While at work 10/2021, she bent over to pick something up and when she stood up she felt as though she was spinning, nausea.  Had LOC but was lowered to the ground by her coworkers- no trauma.  Taken to ER and had brain imagine; she was told she had a TIA.  Continues to note dizziness after bending over as well as getting up from laying on right side.  Established care with Neuro ~ 4 months ago - Helder Handy ().  Pending balance test.  Pending ENT evaluation.  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  \par \par \par

## 2023-02-16 ENCOUNTER — APPOINTMENT (OUTPATIENT)
Dept: ORTHOPEDIC SURGERY | Facility: CLINIC | Age: 48
End: 2023-02-16
Payer: OTHER MISCELLANEOUS

## 2023-02-16 PROCEDURE — 99072 ADDL SUPL MATRL&STAF TM PHE: CPT

## 2023-02-16 PROCEDURE — 99213 OFFICE O/P EST LOW 20 MIN: CPT

## 2023-02-21 NOTE — ASSESSMENT
[FreeTextEntry1] : Discussed with the patient exam history and imaging consistent ankle contusion recommend activity modification rest for the additional 3 weeks  Patient informed that this may take several weeks to months to fully resolve.  She is to follow up on as needed basis\par

## 2023-02-21 NOTE — HISTORY OF PRESENT ILLNESS
[de-identified] : Patient is an established patient presenting for follow-up evaluation.  States persistent lateral ankle pain- improved but pain with prolonged ambulation / standing

## 2023-02-21 NOTE — PHYSICAL EXAM
[de-identified] : Left ankle\par \par Constitutional: \par The patient is healthy-appearing and in no apparent distress. \par \par Gait and Station: \par The patient ambulates with a normal gait and no limp. \par \par Cardiovascular System: \par The capillary refill is less than 2 seconds. \par \par Skin: \par There are no skin abnormalities of ankle.\par \par Ankles and Feet: \par Inspection: \par There is no erythema.\par There is no induration.\par There is no warmth.\par There is no deformity.  \par There is no swelling. \par \par Bony Palpation: \par There is no tenderness of the calcaneal tuberosity.\par There is no tenderness of the metatarsals.\par There is no tenderness of the tarsometatarsal joints\par There is no tenderness of the navicular tuberosity. \par There is no tenderness of the dome of talus.\par There is no tenderness of the head of talus.\par There is no tenderness of the inferior tibiofibular joint.\par There is mild tenderness of the distal fibula.\par \par Soft Tissue Palpation: \par There is no tenderness of the tibialis posterior.\par There is no tenderness of the tibialis anterior. \par There is no tenderness of the plantar fascia.\par There is no tenderness of the Achilles tendon.\par There is no tenderness of the extensor hallucis longus.\par There is no tenderness of the sinus tarsi. \par There is no tenderness of the peroneus longus and brevis.\par There is no tenderness of the deltoid ligament.   \par There is no tenderness of the anterior talofibular ligament and the calcaneofibular ligament. \par \par Active Range of Motion: \par The range of motion at the ankle is full. \par \par Stability: \par The anterior drawer is negative. \par \par Strength: \par There is 5/5 ankle plantarflexion and dorsiflexion.\par \par Neurological System: \par There is normal sensation to light touch at the ankle and foot. \par \par Psychiatric: \par The patient demonstrates a normal mood and affect and is active and alert.

## 2023-02-27 ENCOUNTER — APPOINTMENT (OUTPATIENT)
Dept: ENDOCRINOLOGY | Facility: CLINIC | Age: 48
End: 2023-02-27

## 2023-03-02 ENCOUNTER — APPOINTMENT (OUTPATIENT)
Dept: HEART AND VASCULAR | Facility: CLINIC | Age: 48
End: 2023-03-02
Payer: COMMERCIAL

## 2023-03-02 VITALS
DIASTOLIC BLOOD PRESSURE: 69 MMHG | BODY MASS INDEX: 24.11 KG/M2 | HEART RATE: 120 BPM | HEIGHT: 62 IN | TEMPERATURE: 97.7 F | WEIGHT: 131 LBS | SYSTOLIC BLOOD PRESSURE: 94 MMHG | OXYGEN SATURATION: 94 %

## 2023-03-02 PROCEDURE — 99214 OFFICE O/P EST MOD 30 MIN: CPT

## 2023-03-02 NOTE — HISTORY OF PRESENT ILLNESS
[FreeTextEntry1] : 46 y/o female with h/o migraine, tia/syncope, predm, gerd who presents for f/up today\par \par \par last seen 12/22\par \par ordered echo w bubble\par ep eval - getting monitor, \par calcium score not done\par neuro eval ongoing\par \par -Echo w bubble 2/23: \par 1. Left ventricular systolic function is normal with an ejection fraction visually estimated at 55 to 60%.\par 2. There is concentric remodeling.\par 3. Right ventricular systolic function is probably normal.\par 4. Agitated saline study shows no early interatrial shunt; late bubbles are noted consisted with pulmonary shunt.\par 5. Mild mitral regurgitation.\par 6. Pulmonary artery systolic pressure could not be estimated.\par 7. Trace pericardial effusion.\par 8. There are no prior studies available for comparison.\par \par no cp, sob, lh, palpitations, edema, orthopnea, pnd, syncope\par \par had loc episode at work 10/2021 - bent over to get something - had LOC - felt spinning\par taken to ER - had brain imaging - told she had TIA\par  \par h/o migraines\par \par walks for exercise\par \par PMH/PSH:\par ovarian cyst\par tia/syncope\par insomnia\par thyroid nodule\par gerd\par predm\par vit D deficiency\par s/p appy\par s/p left oophorectomy\par migraine\par anemia\par \par ALL:\par nkda\par \par \par \par MEDS:\par asa 81 mg qd\par pantoprazole 40 mg prn\par cetirizine prn\par \par \par \par SH:\par no tobacco\par no etoh/drugs\par OR pharmacy tech\par from [place]\par no children\par \par lives w roomate\par \par \par FH:\par mother -dm, alive\par father - dm, alive\par 2 siblings - alive, healthy\par

## 2023-03-02 NOTE — DISCUSSION/SUMMARY
[Patient] : the patient [___ Month(s)] : in [unfilled] month(s) [FreeTextEntry1] : 46 y/o female with h/o migraine, tia/syncope, predm, gerd who presents for f/up today\par \par -ekg 2/23 reviewed\par -ekg 12/22 - nsr, normal intervals, no st/t changes\par -labs 2022 reviewed\par -counseled on cvd risk factors\par -continue asa for now\par -close monitoring blood sugar for predm\par -neuro eval for tia/syncope, obtain prior imaging brain, may need CTA head/neck, seizure evaluation\par -calcium score for risk stratification\par -ep recs for long term monitor\par -Echo w bubble 2/23: \par 1. Left ventricular systolic function is normal with an ejection fraction visually estimated at 55 to 60%.\par 2. There is concentric remodeling.\par 3. Right ventricular systolic function is probably normal.\par 4. Agitated saline study shows no early interatrial shunt; late bubbles are noted consisted with pulmonary shunt.\par 5. Mild mitral regurgitation.\par 6. Pulmonary artery systolic pressure could not be estimated.\par 7. Trace pericardial effusion.\par 8. There are no prior studies available for comparison.\par -f/up 3 months for tia/syncope\par \par I have spent 30 minutes reviewing labs, records, tests and discussed cvd risk factors, tia/syncope\par \par

## 2023-03-06 ENCOUNTER — APPOINTMENT (OUTPATIENT)
Dept: GASTROENTEROLOGY | Facility: CLINIC | Age: 48
End: 2023-03-06
Payer: COMMERCIAL

## 2023-03-06 DIAGNOSIS — K21.9 GASTRO-ESOPHAGEAL REFLUX DISEASE W/OUT ESOPHAGITIS: ICD-10-CM

## 2023-03-06 DIAGNOSIS — Z12.11 ENCOUNTER FOR SCREENING FOR MALIGNANT NEOPLASM OF COLON: ICD-10-CM

## 2023-03-06 PROCEDURE — 99202 OFFICE O/P NEW SF 15 MIN: CPT | Mod: 95

## 2023-03-07 PROBLEM — K21.9 CHRONIC GERD: Status: ACTIVE | Noted: 2023-03-07

## 2023-03-07 PROBLEM — Z12.11 COLON CANCER SCREENING: Status: ACTIVE | Noted: 2023-03-07

## 2023-03-07 NOTE — HISTORY OF PRESENT ILLNESS
[FreeTextEntry1] : 48 y/o F with pmhx of  GERD on PPI, H. Pylori, Peptic ulcer (Age 20), TIA (2021) on ASA 81 mg and anemia presents to clinic for CRC screening.   Admits to frequent GERD symptoms, burning sensation in the chest, sx managed with PPI and reducing stress. Pt reports she takes pantoprazole intermittently to provide symptom relief. She avoids spicy and acidic foods. Last EGD 2014 which revealed erythema and was advised to continue PPI. Reports daily formed BM without blood or mucus in the stool. Denies fever, chill, cp, sob, abd pain, nausea, vomiting, diarrhea, constipation, hematochezia, flatulence, belching, dysphagia, or odynophagia, unintentional wt loss or loss of appetite\par \par \par Pt is currently taking meloxicam for foot damage. \par \par Pmhx cont'd: Ovarian cyst, insomnia, thyroid nodule, pre-DM, AUB\par SHx: appendectomy, left oophorectomy \par  SH: no tobacco, no alcohol, no drugs\par Occupation: Pt is a pharmacist \par FH: Denies FHx GI cancer\par Meds: Pantoprazole prn and cetrizine\par NSAIDS: Asa 81 mg\par All: Narcotics, tramadol

## 2023-03-07 NOTE — ASSESSMENT
[FreeTextEntry1] : Average risk index screening colonoscopy\par \par Schedule colonoscopy at [facility]\par -Obtain pre-op labs (CBC + COMP)\par -Bowel prep provided\par -r/b/a/i discussed and patient agreeable\par -Details of procedure, including risks of procedure which include but not limited to bleeding, perforation, infection, -missed polyp discussed and patient gives verbal consent. Written consent to be obtained at time of procedure.\par -Pt was advised that an escort is needed to  from procedure\par -Will f/u post procedure\par \par \par EGD\par - Hx of H Pylori and peptic ulcer, last EGD 2014 does not have results, will schedule repeat EGD \par - Continues to have GERD symptoms despite PPI use\par - r/b/a/i

## 2023-03-20 ENCOUNTER — TRANSCRIPTION ENCOUNTER (OUTPATIENT)
Age: 48
End: 2023-03-20

## 2023-03-21 ENCOUNTER — TRANSCRIPTION ENCOUNTER (OUTPATIENT)
Age: 48
End: 2023-03-21

## 2023-03-22 ENCOUNTER — TRANSCRIPTION ENCOUNTER (OUTPATIENT)
Age: 48
End: 2023-03-22

## 2023-03-24 ENCOUNTER — TRANSCRIPTION ENCOUNTER (OUTPATIENT)
Age: 48
End: 2023-03-24

## 2023-03-30 ENCOUNTER — APPOINTMENT (OUTPATIENT)
Dept: ORTHOPEDIC SURGERY | Facility: CLINIC | Age: 48
End: 2023-03-30
Payer: OTHER MISCELLANEOUS

## 2023-03-30 DIAGNOSIS — S90.02XA CONTUSION OF LEFT ANKLE, INITIAL ENCOUNTER: ICD-10-CM

## 2023-03-30 PROCEDURE — 99213 OFFICE O/P EST LOW 20 MIN: CPT

## 2023-03-30 NOTE — REASON FOR VISIT
[Follow-Up Visit] : a follow-up visit for [Workers' Comp: Date of Injury: _______] : This visit is related to worker's compensation. Date of Injury: [unfilled] [FreeTextEntry2] : f/u left ankle

## 2023-03-30 NOTE — ASSESSMENT
[FreeTextEntry1] : Discussed at length with patient exam history and imaging prior as well as improvement.  I reviewed this patient's symptoms consistent with a contusion and recommendations at this time to increase activity.  Discussed regards to return to work and patient as time states she does not feel comfortable returning to work secondary to her job being somewhat labor-intensive.  Discussed physical therapy and a prescription was provided and she is to follow up in 3 weeks with the expectation that time that I will have to resume her to work based on the physical exam

## 2023-03-30 NOTE — HISTORY OF PRESENT ILLNESS
[de-identified] : Patient is an established patient last seen 6 weeks ago stating persistent left lateral ankle discomfort which is improved.  She was recommended for physical therapy and observation but did not go physical therapy as she was not improved from work comp until recently.  Discussed with her overall clinical improvement and her no longer using a cane

## 2023-03-30 NOTE — PHYSICAL EXAM
[de-identified] : Left ankle\par \par Constitutional: \par The patient is healthy-appearing and in no apparent distress. \par \par Gait and Station: \par The patient ambulates with a normal gait and no limp. \par \par Cardiovascular System: \par The capillary refill is less than 2 seconds. \par \par Skin: \par There are no skin abnormalities of ankle.\par \par Ankles and Feet: \par Inspection: \par There is no erythema.\par There is no induration.\par There is no warmth.\par There is no deformity.  \par There is no swelling. \par \par Bony Palpation: \par There is no tenderness of the calcaneal tuberosity.\par There is no tenderness of the metatarsals.\par There is no tenderness of the tarsometatarsal joints\par There is no tenderness of the navicular tuberosity. \par There is no tenderness of the dome of talus.\par There is no tenderness of the head of talus.\par There is no tenderness of the inferior tibiofibular joint.\par There is mild tenderness of the distal fibula.\par \par Soft Tissue Palpation: \par There is no tenderness of the tibialis posterior.\par There is no tenderness of the tibialis anterior. \par There is no tenderness of the plantar fascia.\par There is no tenderness of the Achilles tendon.\par There is no tenderness of the extensor hallucis longus.\par There is no tenderness of the sinus tarsi. \par There is no tenderness of the peroneus longus and brevis.\par There is no tenderness of the deltoid ligament.   \par There is mild tenderness of the anterior talofibular ligament and the calcaneofibular ligament. \par \par Active Range of Motion: \par The range of motion at the ankle is full. \par \par Stability: \par The anterior drawer is negative. \par \par Strength: \par There is 5/5 ankle plantarflexion and dorsiflexion.\par \par Neurological System: \par There is normal sensation to light touch at the ankle and foot. \par \par Psychiatric: \par The patient demonstrates a normal mood and affect and is active and alert.

## 2023-04-05 ENCOUNTER — FORM ENCOUNTER (OUTPATIENT)
Age: 48
End: 2023-04-05

## 2023-04-15 ENCOUNTER — APPOINTMENT (OUTPATIENT)
Dept: HEART AND VASCULAR | Facility: CLINIC | Age: 48
End: 2023-04-15
Payer: COMMERCIAL

## 2023-04-15 PROCEDURE — 93248 EXT ECG>7D<15D REV&INTERPJ: CPT

## 2023-04-17 ENCOUNTER — TRANSCRIPTION ENCOUNTER (OUTPATIENT)
Age: 48
End: 2023-04-17

## 2023-04-21 ENCOUNTER — TRANSCRIPTION ENCOUNTER (OUTPATIENT)
Age: 48
End: 2023-04-21

## 2023-05-03 ENCOUNTER — APPOINTMENT (OUTPATIENT)
Dept: ORTHOPEDIC SURGERY | Facility: CLINIC | Age: 48
End: 2023-05-03

## 2023-05-17 ENCOUNTER — APPOINTMENT (OUTPATIENT)
Dept: OTOLARYNGOLOGY | Facility: CLINIC | Age: 48
End: 2023-05-17

## 2023-06-15 ENCOUNTER — APPOINTMENT (OUTPATIENT)
Age: 48
End: 2023-06-15

## 2023-06-27 ENCOUNTER — TRANSCRIPTION ENCOUNTER (OUTPATIENT)
Age: 48
End: 2023-06-27

## 2023-06-28 ENCOUNTER — APPOINTMENT (OUTPATIENT)
Dept: ORTHOPEDIC SURGERY | Facility: CLINIC | Age: 48
End: 2023-06-28

## 2023-07-11 ENCOUNTER — APPOINTMENT (OUTPATIENT)
Dept: INTERNAL MEDICINE | Facility: CLINIC | Age: 48
End: 2023-07-11
Payer: COMMERCIAL

## 2023-07-11 ENCOUNTER — NON-APPOINTMENT (OUTPATIENT)
Age: 48
End: 2023-07-11

## 2023-07-11 VITALS
RESPIRATION RATE: 15 BRPM | BODY MASS INDEX: 24.11 KG/M2 | TEMPERATURE: 99.3 F | HEIGHT: 62 IN | WEIGHT: 131 LBS | OXYGEN SATURATION: 97 % | SYSTOLIC BLOOD PRESSURE: 108 MMHG | HEART RATE: 114 BPM | DIASTOLIC BLOOD PRESSURE: 75 MMHG

## 2023-07-11 DIAGNOSIS — Z01.812 ENCOUNTER FOR PREPROCEDURAL LABORATORY EXAMINATION: ICD-10-CM

## 2023-07-11 DIAGNOSIS — Z01.818 ENCOUNTER FOR OTHER PREPROCEDURAL EXAMINATION: ICD-10-CM

## 2023-07-11 PROCEDURE — 93000 ELECTROCARDIOGRAM COMPLETE: CPT

## 2023-07-11 PROCEDURE — 99214 OFFICE O/P EST MOD 30 MIN: CPT | Mod: 25

## 2023-07-11 PROCEDURE — 36415 COLL VENOUS BLD VENIPUNCTURE: CPT

## 2023-07-11 RX ORDER — FLUTICASONE PROPIONATE 50 UG/1
50 SPRAY, METERED NASAL DAILY
Qty: 1 | Refills: 3 | Status: ACTIVE | COMMUNITY
Start: 2023-01-05 | End: 1900-01-01

## 2023-07-12 DIAGNOSIS — Z67.20 TYPE B BLOOD, RH POSITIVE: ICD-10-CM

## 2023-07-12 LAB
ABO + RH PNL BLD: NORMAL
ALBUMIN SERPL ELPH-MCNC: 4.8 G/DL
ALP BLD-CCNC: 87 U/L
ALT SERPL-CCNC: 22 U/L
ANION GAP SERPL CALC-SCNC: 13 MMOL/L
APTT BLD: 38 SEC
AST SERPL-CCNC: 35 U/L
BILIRUB SERPL-MCNC: 0.3 MG/DL
BUN SERPL-MCNC: 10 MG/DL
CALCIUM SERPL-MCNC: 10.4 MG/DL
CHLORIDE SERPL-SCNC: 102 MMOL/L
CO2 SERPL-SCNC: 24 MMOL/L
CREAT SERPL-MCNC: 0.74 MG/DL
EGFR: 101 ML/MIN/1.73M2
GLUCOSE SERPL-MCNC: 80 MG/DL
HCG UR QL: NEGATIVE
INR PPP: 1.01 RATIO
POTASSIUM SERPL-SCNC: 4.4 MMOL/L
PROT SERPL-MCNC: 7.7 G/DL
PT BLD: 11.7 SEC
SODIUM SERPL-SCNC: 139 MMOL/L

## 2023-07-12 NOTE — PHYSICAL EXAM
[No Acute Distress] : no acute distress [Normal Sclera/Conjunctiva] : normal sclera/conjunctiva [EOMI] : extraocular movements intact [No Respiratory Distress] : no respiratory distress  [No Edema] : there was no peripheral edema [Normal] : affect was normal and insight and judgment were intact

## 2023-07-12 NOTE — ASSESSMENT
[High Risk Surgery - Intraperitoneal, Intrathoracic or Supringuinal Vascular Procedures] : High Risk Surgery - Intraperitoneal, Intrathoracic or Supringuinal Vascular Procedures - No (0) [Ischemic Heart Disease] : Ischemic Heart Disease - No (0) [Congestive Heart Failure] : Congestive Heart Failure - No (0) [Prior Cerebrovascular Accident or TIA] : Prior Cerebrovascular Accident or TIA - Yes (1) [Creatinine >= 2mg/dL (1 Point)] : Creatinine >= 2mg/dL - No (0) [Insulin-dependent Diabetic (1 Point)] : Insulin-dependent Diabetic - No (0) [1] : 1 , RCRI Class: II, Risk of Post-Op Cardiac Complications: 6.0%, 95% CI for Risk Estimate: 4.9% - 7.4% [Patient Optimized for Surgery] : Patient optimized for surgery [No Further Testing Recommended] : no further testing recommended [Modify anti-platelet treatment prior to procedure] : Modify anti-platelet treatment prior to procedure [FreeTextEntry4] : LOW risk for LOW risk procedure. May proceed with elective surgery.  [FreeTextEntry6] : HOLD ASA 5 days prior [FreeTextEntry7] : HOLD all NSAIDs 1 week prior

## 2023-07-12 NOTE — HISTORY OF PRESENT ILLNESS
[Moderate (4-6 METs)] : Moderate (4-6 METs) [No Pertinent Cardiac History] : no history of aortic stenosis, atrial fibrillation, coronary artery disease, recent myocardial infarction, or implantable device/pacemaker [No Pertinent Pulmonary History] : no history of asthma, COPD, sleep apnea, or smoking [No Adverse Anesthesia Reaction] : no adverse anesthesia reaction in self or family member [(Patient denies any chest pain, claudication, dyspnea on exertion, orthopnea, palpitations or syncope)] : Patient denies any chest pain, claudication, dyspnea on exertion, orthopnea, palpitations or syncope [Chronic Anticoagulation] : no chronic anticoagulation [Chronic Kidney Disease] : no chronic kidney disease [Diabetes] : no diabetes [FreeTextEntry1] : hysteroscopic D&C [FreeTextEntry2] : 7/24/23 [FreeTextEntry3] : Dr. Lan [FreeTextEntry4] : irregular menstrual bleeding w/ thickened endometrial strip requiring biopsy and d &c to be performed under anesthesia.

## 2023-07-17 ENCOUNTER — APPOINTMENT (OUTPATIENT)
Dept: ENDOCRINOLOGY | Facility: CLINIC | Age: 48
End: 2023-07-17

## 2023-09-06 ENCOUNTER — APPOINTMENT (OUTPATIENT)
Dept: OTOLARYNGOLOGY | Facility: CLINIC | Age: 48
End: 2023-09-06
Payer: COMMERCIAL

## 2023-09-06 DIAGNOSIS — J30.89 OTHER ALLERGIC RHINITIS: ICD-10-CM

## 2023-09-06 PROCEDURE — 99204 OFFICE O/P NEW MOD 45 MIN: CPT | Mod: 25

## 2023-09-06 PROCEDURE — 31231 NASAL ENDOSCOPY DX: CPT

## 2023-09-06 RX ORDER — AZELASTINE HYDROCHLORIDE 137 UG/1
0.1 SPRAY, METERED NASAL TWICE DAILY
Qty: 1 | Refills: 6 | Status: DISCONTINUED | COMMUNITY
Start: 2022-08-10 | End: 2023-09-06

## 2023-09-06 RX ORDER — OLOPATADINE HYDROCHLORIDE 665 UG/1
0.6 SPRAY, METERED NASAL TWICE DAILY
Qty: 1 | Refills: 2 | Status: ACTIVE | COMMUNITY
Start: 2023-09-06 | End: 1900-01-01

## 2023-09-06 RX ORDER — MONTELUKAST 10 MG/1
10 TABLET, FILM COATED ORAL DAILY
Qty: 1 | Refills: 6 | Status: ACTIVE | COMMUNITY
Start: 2023-09-06 | End: 1900-01-01

## 2023-09-06 NOTE — PHYSICAL EXAM
[de-identified] : left thyroid nodule  [Nasal Endoscopy Performed] : nasal endoscopy was performed, see procedure section for findings [de-identified] : edema  [Normal] : mucosa is normal [Midline] : trachea located in midline position

## 2023-09-06 NOTE — DATA REVIEWED
[de-identified] : Test waEXAM:  US THYROID ONLY  PROCEDURE DATE:  12/08/2022    INTERPRETATION:  THYROID ULTRASOUND with Doppler dated 12/8/2022 5:38 PM  History: Thyroid nodules, largest left thyroid nodule.  Technique: Ultrasound evaluation of the thyroid was performed in the axial and sagittal projections. Color Doppler evaluation was also performed.  Prior study: None.  Findings:  RIGHT LOBE: Dimensions: 5.0 x 1.7 x 1.2 cm (sagittal x AP x transverse) Echotexture: Homogenous Vascularity: Normal The right lobe contains punctate benign cystic nodule 0.3 cm.   LEFT LOBE: Dimensions: 5.2 x 2.3 x 3.0 cm (sagittal x AP x transverse) Echotexture: Homogenous Vascularity: Normal The left lobe contains the following nodules  Nodule 1: Location: Mid Dimensions: 4.1 x 2.3 x 3.1 cm (sagittal x AP x transverse) Composition: Complex cystic with solid eccentric areas For solid nodules or for the solid portion of a partially cystic nodule, does the solid portion have any of the following suspicious features? -  No: Hypoechoic -  No: Microcalcifications -  No: Irregular margin (infiltrative or microlobulated) -  No: Taller than wide (AP dimension > transverse) -  No: Extrathyroidal extension -  No: Interrupted rim calcification with soft tissue extrusion    ISTHMUS: Dimensions: 0.2 cm AP The isthmus lobe contains no visible nodules  CERVICAL LYMPH NODE EVALUATION: -  No abnormal lymph nodes are identified in the neck.  PARATHYROID EXAMINATION: -  Parathyroid glands not visualized.   IMPRESSION: Left thyroid nodule meets criteria for fine-needle aspiration biopsy if not already performed.s reviewed  and interpreted by me. See official report below. [de-identified] : Test was reviewed  and interpreted by me. See official report below. Patient:   TERESSA HERNANDEZ   Accession:                             [id number]  Collected Date/Time:                   2/2/2023 11:18 EST Received Date/Time:                    2/3/2023 11:18 EST  Fine Needle Aspiration Report - Auth (Verified)  Specimen(s) Submitted THYROID,LEFT,4.1 CM, FNA  Final Diagnosis THYROID,LEFT,4.1 CM, FNA  NONDIAGNOSTIC (CATEGORY 1),   see note.   Note: The aspirate shows abundant hemosiderin laden macrophages and watery colloid.  Follicular cells are not seen. 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  The  FNA is deemed nondiagnostic due to inadequate follicular cells.  Correlation with radiologic findings recommended.

## 2023-09-06 NOTE — PROCEDURE
[None] : none [Flexible Endoscope] : examined with the flexible endoscope [Congested] : congested [Allergic] : allergic signs [Lily] : lily [S-Shaped Deviated] : S-shape deviation [FreeTextEntry6] : The following anatomic sites were directly examined in a sequential fashion: The scope was introduced in the nasal passage between the middle and inferior turbinates to exam the inferior portion of the middle meatus and the fontanelle, as well as the maxillary ostia. Next, the scope was passed medically and posteriorly to the middle turbinates to examine the sphenoethmoid recess and the superior turbinate region. turbinate hypertrophy [de-identified] : nasal congestion, rhinitis

## 2023-09-06 NOTE — HISTORY OF PRESENT ILLNESS
[de-identified] : 49 y/o F being for a consultation for thyroid bx. Pt states nasal congestion with greenish mucus and blood only in the morning. Pt has severe congestion with sneezing and nasal drip and some facial pressure. Pt takes cetirizine with mild improvement. Pt refuses fluticasone. Medical notes reviewed 7/23.  Pt has 4.1 cm left thyroid nodule that was aspirated and [place] 1 on pathology. Pt had resolution of swelling but nodule persists. Original u/s 12/22 reviewed and shows nodule and goiter. endocrine notes reviewed 3/23 along with PAth 2/23. Pt has no compressive symptoms.

## 2023-09-12 ENCOUNTER — TRANSCRIPTION ENCOUNTER (OUTPATIENT)
Age: 48
End: 2023-09-12

## 2023-10-17 ENCOUNTER — APPOINTMENT (OUTPATIENT)
Dept: INTERNAL MEDICINE | Facility: CLINIC | Age: 48
End: 2023-10-17
Payer: COMMERCIAL

## 2023-10-17 VITALS
RESPIRATION RATE: 16 BRPM | SYSTOLIC BLOOD PRESSURE: 111 MMHG | HEART RATE: 82 BPM | HEIGHT: 62 IN | DIASTOLIC BLOOD PRESSURE: 76 MMHG | BODY MASS INDEX: 24.66 KG/M2 | OXYGEN SATURATION: 95 % | WEIGHT: 134 LBS

## 2023-10-17 DIAGNOSIS — Z78.9 OTHER SPECIFIED HEALTH STATUS: ICD-10-CM

## 2023-10-17 DIAGNOSIS — Z00.00 ENCOUNTER FOR GENERAL ADULT MEDICAL EXAMINATION W/OUT ABNORMAL FINDINGS: ICD-10-CM

## 2023-10-17 DIAGNOSIS — Z23 ENCOUNTER FOR IMMUNIZATION: ICD-10-CM

## 2023-10-17 DIAGNOSIS — Z83.3 FAMILY HISTORY OF DIABETES MELLITUS: ICD-10-CM

## 2023-10-17 DIAGNOSIS — Z82.61 FAMILY HISTORY OF ARTHRITIS: ICD-10-CM

## 2023-10-17 DIAGNOSIS — G45.9 TRANSIENT CEREBRAL ISCHEMIC ATTACK, UNSPECIFIED: ICD-10-CM

## 2023-10-17 DIAGNOSIS — Z83.49 FAMILY HISTORY OF OTHER ENDOCRINE, NUTRITIONAL AND METABOLIC DISEASES: ICD-10-CM

## 2023-10-17 DIAGNOSIS — R73.03 PREDIABETES.: ICD-10-CM

## 2023-10-17 DIAGNOSIS — K21.9 GASTRO-ESOPHAGEAL REFLUX DISEASE W/OUT ESOPHAGITIS: ICD-10-CM

## 2023-10-17 DIAGNOSIS — E55.9 VITAMIN D DEFICIENCY, UNSPECIFIED: ICD-10-CM

## 2023-10-17 PROCEDURE — 83014 H PYLORI DRUG ADMIN: CPT

## 2023-10-17 PROCEDURE — 99213 OFFICE O/P EST LOW 20 MIN: CPT | Mod: 25

## 2023-10-17 PROCEDURE — 99396 PREV VISIT EST AGE 40-64: CPT | Mod: 25

## 2023-10-17 PROCEDURE — 36415 COLL VENOUS BLD VENIPUNCTURE: CPT

## 2023-10-17 RX ORDER — AZELASTINE HYDROCHLORIDE 137 UG/1
0.1 SPRAY, METERED NASAL DAILY
Qty: 1 | Refills: 6 | Status: COMPLETED | COMMUNITY
Start: 2023-09-06 | End: 2023-10-17

## 2023-10-17 RX ORDER — CHOLECALCIFEROL (VITAMIN D3) 1250 MCG
1.25 MG CAPSULE ORAL
Qty: 12 | Refills: 3 | Status: ACTIVE | COMMUNITY
Start: 2022-08-12 | End: 1900-01-01

## 2023-10-20 ENCOUNTER — TRANSCRIPTION ENCOUNTER (OUTPATIENT)
Age: 48
End: 2023-10-20

## 2023-10-20 LAB
25(OH)D3 SERPL-MCNC: 93 NG/ML
ALBUMIN SERPL ELPH-MCNC: 4.8 G/DL
ALP BLD-CCNC: 86 U/L
ALT SERPL-CCNC: 21 U/L
ANION GAP SERPL CALC-SCNC: 11 MMOL/L
APPEARANCE: CLEAR
AST SERPL-CCNC: 25 U/L
BACTERIA: NEGATIVE /HPF
BASOPHILS # BLD AUTO: 0.09 K/UL
BASOPHILS NFR BLD AUTO: 1.1 %
BILIRUB SERPL-MCNC: 0.3 MG/DL
BILIRUBIN URINE: NEGATIVE
BLOOD URINE: NEGATIVE
BUN SERPL-MCNC: 10 MG/DL
CALCIUM SERPL-MCNC: 10.1 MG/DL
CAST: 0 /LPF
CHLORIDE SERPL-SCNC: 103 MMOL/L
CHOLEST SERPL-MCNC: 160 MG/DL
CO2 SERPL-SCNC: 24 MMOL/L
COLOR: YELLOW
CREAT SERPL-MCNC: 0.63 MG/DL
EGFR: 111 ML/MIN/1.73M2
EOSINOPHIL # BLD AUTO: 0.28 K/UL
EOSINOPHIL NFR BLD AUTO: 3.5 %
EPITHELIAL CELLS: 0 /HPF
ESTIMATED AVERAGE GLUCOSE: 114 MG/DL
GLUCOSE QUALITATIVE U: NEGATIVE MG/DL
GLUCOSE SERPL-MCNC: 85 MG/DL
HBA1C MFR BLD HPLC: 5.6 %
HCT VFR BLD CALC: 45.6 %
HDLC SERPL-MCNC: 59 MG/DL
HGB BLD-MCNC: 14 G/DL
IMM GRANULOCYTES NFR BLD AUTO: 0.4 %
KETONES URINE: NEGATIVE MG/DL
LDLC SERPL CALC-MCNC: 70 MG/DL
LEUKOCYTE ESTERASE URINE: NEGATIVE
LYMPHOCYTES # BLD AUTO: 2.21 K/UL
LYMPHOCYTES NFR BLD AUTO: 27.3 %
MAN DIFF?: NORMAL
MCHC RBC-ENTMCNC: 26.9 PG
MCHC RBC-ENTMCNC: 30.7 GM/DL
MCV RBC AUTO: 87.5 FL
MICROSCOPIC-UA: NORMAL
MONOCYTES # BLD AUTO: 0.57 K/UL
MONOCYTES NFR BLD AUTO: 7 %
NEUTROPHILS # BLD AUTO: 4.93 K/UL
NEUTROPHILS NFR BLD AUTO: 60.7 %
NITRITE URINE: NEGATIVE
NONHDLC SERPL-MCNC: 100 MG/DL
PH URINE: 6.5
PLATELET # BLD AUTO: 279 K/UL
POTASSIUM SERPL-SCNC: 4.6 MMOL/L
PROT SERPL-MCNC: 7.5 G/DL
PROTEIN URINE: NEGATIVE MG/DL
RBC # BLD: 5.21 M/UL
RBC # FLD: 13.8 %
RED BLOOD CELLS URINE: 0 /HPF
SODIUM SERPL-SCNC: 137 MMOL/L
SPECIFIC GRAVITY URINE: 1
TRIGL SERPL-MCNC: 182 MG/DL
TSH SERPL-ACNC: 2.44 UIU/ML
UREA BREATH TEST QL: NEGATIVE
UROBILINOGEN URINE: 0.2 MG/DL
WBC # FLD AUTO: 8.11 K/UL
WHITE BLOOD CELLS URINE: 0 /HPF

## 2023-10-31 ENCOUNTER — APPOINTMENT (OUTPATIENT)
Dept: INTERNAL MEDICINE | Facility: CLINIC | Age: 48
End: 2023-10-31
Payer: COMMERCIAL

## 2023-10-31 VITALS
TEMPERATURE: 98.4 F | SYSTOLIC BLOOD PRESSURE: 116 MMHG | HEART RATE: 106 BPM | OXYGEN SATURATION: 95 % | DIASTOLIC BLOOD PRESSURE: 79 MMHG | RESPIRATION RATE: 16 BRPM

## 2023-10-31 DIAGNOSIS — Z01.818 ENCOUNTER FOR OTHER PREPROCEDURAL EXAMINATION: ICD-10-CM

## 2023-10-31 DIAGNOSIS — Z91.89 OTHER SPECIFIED PERSONAL RISK FACTORS, NOT ELSEWHERE CLASSIFIED: ICD-10-CM

## 2023-10-31 PROCEDURE — 99213 OFFICE O/P EST LOW 20 MIN: CPT

## 2023-11-01 PROBLEM — Z01.818 PRE-OP EXAM: Status: ACTIVE | Noted: 2023-11-01 | Resolved: 2023-11-02

## 2023-11-09 ENCOUNTER — APPOINTMENT (OUTPATIENT)
Age: 48
End: 2023-11-09
Payer: COMMERCIAL

## 2023-11-09 ENCOUNTER — RESULT REVIEW (OUTPATIENT)
Age: 48
End: 2023-11-09

## 2023-11-09 PROCEDURE — 43239 EGD BIOPSY SINGLE/MULTIPLE: CPT

## 2023-11-09 PROCEDURE — 45385 COLONOSCOPY W/LESION REMOVAL: CPT | Mod: 33

## 2023-11-15 ENCOUNTER — NON-APPOINTMENT (OUTPATIENT)
Age: 48
End: 2023-11-15

## 2023-11-15 RX ORDER — OMEPRAZOLE 40 MG/1
40 CAPSULE, DELAYED RELEASE ORAL
Qty: 90 | Refills: 3 | Status: ACTIVE | COMMUNITY
Start: 2023-01-05 | End: 1900-01-01

## 2023-12-12 ENCOUNTER — TRANSCRIPTION ENCOUNTER (OUTPATIENT)
Age: 48
End: 2023-12-12

## 2023-12-13 ENCOUNTER — APPOINTMENT (OUTPATIENT)
Dept: OPHTHALMOLOGY | Facility: CLINIC | Age: 48
End: 2023-12-13

## 2023-12-15 ENCOUNTER — APPOINTMENT (OUTPATIENT)
Dept: OTOLARYNGOLOGY | Facility: CLINIC | Age: 48
End: 2023-12-15
Payer: COMMERCIAL

## 2023-12-15 DIAGNOSIS — R09.81 NASAL CONGESTION: ICD-10-CM

## 2023-12-15 DIAGNOSIS — E04.1 NONTOXIC SINGLE THYROID NODULE: ICD-10-CM

## 2023-12-15 PROCEDURE — 99214 OFFICE O/P EST MOD 30 MIN: CPT | Mod: 25

## 2023-12-15 PROCEDURE — 31231 NASAL ENDOSCOPY DX: CPT

## 2023-12-15 RX ORDER — CETIRIZINE HYDROCHLORIDE 10 MG/1
10 TABLET, COATED ORAL
Qty: 90 | Refills: 3 | Status: ACTIVE | COMMUNITY
Start: 2023-12-15 | End: 1900-01-01

## 2023-12-15 NOTE — REASON FOR VISIT
[Subsequent Evaluation] : a subsequent evaluation for [FreeTextEntry2] : Nasal congestion and Thyroid nodule.

## 2023-12-15 NOTE — HISTORY OF PRESENT ILLNESS
[de-identified] : 48y F with nasal congestion that was wors4e and is now resolving. and thyroid nodule is here for ultrasound result review. Results reviewed and discussed with patient and it shows decreased left cyst nodule now measuring 3.1 cm. Pt ahs symptoms at this time. Humidifier helps nasal congestion and  it is intermittent. Pt is on cetirizine

## 2023-12-15 NOTE — PROCEDURE
[None] : none [Flexible Endoscope] : examined with the flexible endoscope [Normal] : the nasal mucosa was normal [S-Shaped Deviated] : S-shape deviation [FreeTextEntry6] : The following anatomic sites were directly examined in a sequential fashion: The scope was introduced in the nasal passage between the middle and inferior turbinates to exam the inferior portion of the middle meatus and the fontanelle, as well as the maxillary ostia. Next, the scope was passed medically and posteriorly to the middle turbinates to examine the sphenoethmoid recess and the superior turbinate region.

## 2024-01-04 ENCOUNTER — APPOINTMENT (OUTPATIENT)
Dept: ORTHOPEDIC SURGERY | Facility: CLINIC | Age: 49
End: 2024-01-04
Payer: COMMERCIAL

## 2024-01-04 VITALS — BODY MASS INDEX: 24.29 KG/M2 | HEIGHT: 62 IN | WEIGHT: 132 LBS

## 2024-01-04 DIAGNOSIS — M51.36 OTHER INTERVERTEBRAL DISC DEGENERATION, LUMBAR REGION: ICD-10-CM

## 2024-01-04 PROCEDURE — 72110 X-RAY EXAM L-2 SPINE 4/>VWS: CPT

## 2024-01-04 PROCEDURE — 99204 OFFICE O/P NEW MOD 45 MIN: CPT

## 2024-01-04 PROCEDURE — 72170 X-RAY EXAM OF PELVIS: CPT

## 2024-01-04 RX ORDER — GABAPENTIN 100 MG/1
100 CAPSULE ORAL
Qty: 60 | Refills: 1 | Status: ACTIVE | COMMUNITY
Start: 2024-01-04 | End: 1900-01-01

## 2024-01-04 NOTE — ASSESSMENT
[FreeTextEntry1] : Intermittent R>LE radic  Can't take nsaids given peptic ulcer  Gabapentin- Patient advised of sedating effects, instructed not to drive, operate machinery, or take with other sedating medications. Advised of need to taper on/off medication and risk of abruptly stopping gabapentin.   Patient has failed 6 weeks of conservative care, including physical therapy and medications. Will obtain MRI to rule out HNP; will also be used to guide potential future injections/surgical management.

## 2024-01-04 NOTE — HISTORY OF PRESENT ILLNESS
[8] : 8 [Sharp] : sharp [Constant] : constant [Meds] : meds [Sitting] : sitting [Standing] : standing [Stairs] : stairs [Lying in bed] : lying in bed [de-identified] : 1/4/24- TERESSA is here is for Lower back, state the pain has been ongoing for 1 year, the pain is on her R side down her leg with numbness and tingling, states the pain increases mainly when lying down, having the most pain on groin area. has been in PT recently, doing massage. Has been in PT for 3 months  [] : no

## 2024-01-04 NOTE — IMAGING
[de-identified] : LSPINE ROM: limited all planes Strength: 5/5 bilateral hip flexors, knee extensors, ankle dorsiflexors, EHL, ankle plantarflexors Sensation: Sensation present to light touch bilateral L2-S1 distributions Provocative maneuvers: Negative L straight leg raise; + L SLR  Bilateral hips- Palpation: No tenderness to palpation over greater trochanter or IT band ROM: No pain with flexion and internal rotation [Facet arthropathy] : Facet arthropathy [Disc space narrowing] : Disc space narrowing [Scoliosis] : Scoliosis [AP] : anteroposterior [There are no fractures, subluxations or dislocations. No significant abnormalities are seen] : There are no fractures, subluxations or dislocations. No significant abnormalities are seen

## 2024-02-15 ENCOUNTER — APPOINTMENT (OUTPATIENT)
Dept: ORTHOPEDIC SURGERY | Facility: CLINIC | Age: 49
End: 2024-02-15
Payer: COMMERCIAL

## 2024-02-15 DIAGNOSIS — M54.16 RADICULOPATHY, LUMBAR REGION: ICD-10-CM

## 2024-02-15 PROCEDURE — 99215 OFFICE O/P EST HI 40 MIN: CPT

## 2024-02-22 PROBLEM — M54.16 LUMBAR RADICULOPATHY: Status: ACTIVE | Noted: 2024-01-04

## 2024-02-22 NOTE — ASSESSMENT
[FreeTextEntry1] : LLE radic with NF HNP. Has some LR narrowing L4/5 and a significant sub-articular portion of HNP.  Discussed and displayed this on her MRI. Discussed approaches, either far lateral endoscope approach versus open posterior approach necessitating facetectomy for adequate decompression.  Endoscopic approach may provide enough relief to avoid posterior open surgery. She voiced understanding.   Indicating for Left L4/5 endoscopic discectomy  Discectomy- We've discussed the surgery details as well as potential for complications including but not limited to pain, scar, bleeding and infection. There is also a possibility for recurrent or residual disk herniation, failure or fracture of bone, and need for future surgery. Finally, we discussed potential for injury to nerves, the spinal cord either transient or permanent, damage to blood vessels, CSF leak, blindness, need for transfusion, and medical complications. The patient verbalized understanding and all questions were answered.  Can't take nsaids given peptic ulcer  Gabapentin- Patient advised of sedating effects, instructed not to drive, operate machinery, or take with other sedating medications. Advised of need to taper on/off medication and risk of abruptly stopping gabapentin.

## 2024-02-22 NOTE — HISTORY OF PRESENT ILLNESS
[8] : 8 [Sharp] : sharp [Constant] : constant [Meds] : meds [Sitting] : sitting [Standing] : standing [Stairs] : stairs [Lying in bed] : lying in bed [de-identified] : 1/9/24 [facility] Lumbar MRI  - report noted in chart.  L5 - S1: There is no disc herniation, spinal stenosis, or foraminal stenosis.  L4 - L5: Broad-based left paracentral/foraminal disc herniation indents the ventral thecal sac, narrows left lateral recess, and impinges upon the traversing left L5 nerve root. There is narrowing of the left neural foramen with exiting left L4 nerve root abutment. The right neural foramen is patent. There is no significant spinal stenosis.  L3 - L4: There is no disc herniation, spinal stenosis, or foraminal stenosis.  L2 - L3: There is no disc herniation, spinal stenosis, or foraminal stenosis.  L1 - L2: There is no disc herniation, spinal stenosis, or foraminal stenosis. Ind. 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 has been in PT recently, doing massage. Has been in PT for 3 months   02/15/2024: Pt is here today to follow up on her L-Spine and discuss MRI results. Pt reports slight improvement since the last visit. She has been taking Gabapentin but would like to discuss other options outside of medication. Has pain radiating down the LLE to the ankle [] : yes [FreeTextEntry7] : LORI

## 2024-02-22 NOTE — IMAGING
[Facet arthropathy] : Facet arthropathy [Disc space narrowing] : Disc space narrowing [Scoliosis] : Scoliosis [AP] : anteroposterior [There are no fractures, subluxations or dislocations. No significant abnormalities are seen] : There are no fractures, subluxations or dislocations. No significant abnormalities are seen [de-identified] : LSPINE skin intact ROM: limited all planes Strength: 5/5 bilateral hip flexors, knee extensors, ankle dorsiflexors, EHL, ankle plantarflexors Sensation: Sensation present to light touch bilateral L2-S1 distributions Provocative maneuvers: Negative L straight leg raise; + L SLR  Bilateral hips- Palpation: No tenderness to palpation over greater trochanter or IT band ROM: No pain with flexion and internal rotation

## 2024-04-07 ENCOUNTER — RX RENEWAL (OUTPATIENT)
Age: 49
End: 2024-04-07

## 2024-04-07 RX ORDER — MELOXICAM 15 MG/1
15 TABLET ORAL
Qty: 30 | Refills: 0 | Status: ACTIVE | COMMUNITY
Start: 2023-02-16 | End: 1900-01-01

## 2024-04-08 ENCOUNTER — RX RENEWAL (OUTPATIENT)
Age: 49
End: 2024-04-08

## 2024-04-08 RX ORDER — CELECOXIB 100 MG/1
100 CAPSULE ORAL TWICE DAILY
Qty: 60 | Refills: 0 | Status: ACTIVE | COMMUNITY
Start: 2023-01-05 | End: 1900-01-01

## 2024-04-08 RX ORDER — ZOLPIDEM TARTRATE 5 MG/1
5 TABLET ORAL
Qty: 30 | Refills: 3 | Status: ACTIVE | COMMUNITY
Start: 2022-04-27 | End: 1900-01-01

## 2024-09-09 ENCOUNTER — RX RENEWAL (OUTPATIENT)
Age: 49
End: 2024-09-09

## 2024-10-15 ENCOUNTER — APPOINTMENT (OUTPATIENT)
Dept: OPHTHALMOLOGY | Facility: CLINIC | Age: 49
End: 2024-10-15

## 2024-10-24 ENCOUNTER — NON-APPOINTMENT (OUTPATIENT)
Age: 49
End: 2024-10-24

## 2024-10-24 ENCOUNTER — APPOINTMENT (OUTPATIENT)
Dept: INTERNAL MEDICINE | Facility: CLINIC | Age: 49
End: 2024-10-24
Payer: COMMERCIAL

## 2024-10-24 VITALS
RESPIRATION RATE: 16 BRPM | WEIGHT: 130 LBS | DIASTOLIC BLOOD PRESSURE: 72 MMHG | BODY MASS INDEX: 23.92 KG/M2 | HEIGHT: 62 IN | OXYGEN SATURATION: 96 % | HEART RATE: 97 BPM | TEMPERATURE: 97.7 F | SYSTOLIC BLOOD PRESSURE: 118 MMHG

## 2024-10-24 DIAGNOSIS — E04.1 NONTOXIC SINGLE THYROID NODULE: ICD-10-CM

## 2024-10-24 DIAGNOSIS — R73.03 PREDIABETES.: ICD-10-CM

## 2024-10-24 DIAGNOSIS — S90.02XA CONTUSION OF LEFT ANKLE, INITIAL ENCOUNTER: ICD-10-CM

## 2024-10-24 DIAGNOSIS — G45.9 TRANSIENT CEREBRAL ISCHEMIC ATTACK, UNSPECIFIED: ICD-10-CM

## 2024-10-24 DIAGNOSIS — Z00.00 ENCOUNTER FOR GENERAL ADULT MEDICAL EXAMINATION W/OUT ABNORMAL FINDINGS: ICD-10-CM

## 2024-10-24 DIAGNOSIS — S99.912A UNSPECIFIED INJURY OF LEFT ANKLE, INITIAL ENCOUNTER: ICD-10-CM

## 2024-10-24 PROCEDURE — 99396 PREV VISIT EST AGE 40-64: CPT

## 2024-10-24 PROCEDURE — 36415 COLL VENOUS BLD VENIPUNCTURE: CPT

## 2024-10-24 PROCEDURE — 93000 ELECTROCARDIOGRAM COMPLETE: CPT

## 2024-10-24 RX ORDER — CLOPIDOGREL BISULFATE 75 MG/1
75 TABLET, FILM COATED ORAL
Qty: 90 | Refills: 3 | Status: ACTIVE | COMMUNITY
Start: 2024-10-24

## 2024-10-29 ENCOUNTER — TRANSCRIPTION ENCOUNTER (OUTPATIENT)
Age: 49
End: 2024-10-29

## 2024-10-29 DIAGNOSIS — E83.52 HYPERCALCEMIA: ICD-10-CM

## 2024-10-29 LAB
ALBUMIN SERPL ELPH-MCNC: 4.7 G/DL
ALP BLD-CCNC: 96 U/L
ALT SERPL-CCNC: 26 U/L
ANION GAP SERPL CALC-SCNC: 14 MMOL/L
APPEARANCE: CLEAR
AST SERPL-CCNC: 23 U/L
BACTERIA: NEGATIVE /HPF
BASOPHILS # BLD AUTO: 0.04 K/UL
BASOPHILS NFR BLD AUTO: 0.5 %
BILIRUB SERPL-MCNC: 0.4 MG/DL
BILIRUBIN URINE: NEGATIVE
BLOOD URINE: NEGATIVE
BUN SERPL-MCNC: 12 MG/DL
CALCIUM SERPL-MCNC: 10.6 MG/DL
CAST: 0 /LPF
CHLORIDE SERPL-SCNC: 102 MMOL/L
CHOLEST SERPL-MCNC: 162 MG/DL
CO2 SERPL-SCNC: 27 MMOL/L
COLOR: YELLOW
CREAT SERPL-MCNC: 0.67 MG/DL
EGFR: 108 ML/MIN/1.73M2
EOSINOPHIL # BLD AUTO: 0.15 K/UL
EOSINOPHIL NFR BLD AUTO: 1.9 %
EPITHELIAL CELLS: 0 /HPF
ESTIMATED AVERAGE GLUCOSE: 123 MG/DL
GLUCOSE QUALITATIVE U: NEGATIVE MG/DL
GLUCOSE SERPL-MCNC: 100 MG/DL
HBA1C MFR BLD HPLC: 5.9 %
HCT VFR BLD CALC: 45.8 %
HCV AB SER QL: NONREACTIVE
HCV S/CO RATIO: 0.07 S/CO
HDLC SERPL-MCNC: 63 MG/DL
HGB BLD-MCNC: 14.5 G/DL
IMM GRANULOCYTES NFR BLD AUTO: 0.5 %
KETONES URINE: NEGATIVE MG/DL
LDLC SERPL CALC-MCNC: 64 MG/DL
LEUKOCYTE ESTERASE URINE: NEGATIVE
LYMPHOCYTES # BLD AUTO: 1.99 K/UL
LYMPHOCYTES NFR BLD AUTO: 25.7 %
MAN DIFF?: NORMAL
MCHC RBC-ENTMCNC: 27.7 PG
MCHC RBC-ENTMCNC: 31.7 GM/DL
MCV RBC AUTO: 87.4 FL
MICROSCOPIC-UA: NORMAL
MONOCYTES # BLD AUTO: 0.56 K/UL
MONOCYTES NFR BLD AUTO: 7.2 %
NEUTROPHILS # BLD AUTO: 4.97 K/UL
NEUTROPHILS NFR BLD AUTO: 64.2 %
NITRITE URINE: NEGATIVE
NONHDLC SERPL-MCNC: 99 MG/DL
PH URINE: 6.5
PLATELET # BLD AUTO: 326 K/UL
POTASSIUM SERPL-SCNC: 4.3 MMOL/L
PROT SERPL-MCNC: 8.1 G/DL
PROTEIN URINE: NEGATIVE MG/DL
RBC # BLD: 5.24 M/UL
RBC # FLD: 12.9 %
RED BLOOD CELLS URINE: 1 /HPF
SODIUM SERPL-SCNC: 143 MMOL/L
SPECIFIC GRAVITY URINE: 1
TRIGL SERPL-MCNC: 222 MG/DL
TSH SERPL-ACNC: 2.95 UIU/ML
UROBILINOGEN URINE: 0.2 MG/DL
WBC # FLD AUTO: 7.75 K/UL
WHITE BLOOD CELLS URINE: 0 /HPF

## 2024-11-22 ENCOUNTER — RX RENEWAL (OUTPATIENT)
Age: 49
End: 2024-11-22

## 2024-11-26 ENCOUNTER — TRANSCRIPTION ENCOUNTER (OUTPATIENT)
Age: 49
End: 2024-11-26

## 2024-11-26 DIAGNOSIS — E83.52 HYPERCALCEMIA: ICD-10-CM

## 2024-11-27 ENCOUNTER — RX RENEWAL (OUTPATIENT)
Age: 49
End: 2024-11-27

## 2024-12-05 ENCOUNTER — APPOINTMENT (OUTPATIENT)
Dept: OPHTHALMOLOGY | Facility: CLINIC | Age: 49
End: 2024-12-05
Payer: COMMERCIAL

## 2024-12-05 ENCOUNTER — NON-APPOINTMENT (OUTPATIENT)
Age: 49
End: 2024-12-05

## 2024-12-05 PROCEDURE — 92004 COMPRE OPH EXAM NEW PT 1/>: CPT

## 2024-12-23 ENCOUNTER — RX RENEWAL (OUTPATIENT)
Age: 49
End: 2024-12-23

## 2024-12-27 ENCOUNTER — APPOINTMENT (OUTPATIENT)
Dept: OTOLARYNGOLOGY | Facility: CLINIC | Age: 49
End: 2024-12-27

## 2025-01-24 ENCOUNTER — RX RENEWAL (OUTPATIENT)
Age: 50
End: 2025-01-24

## 2025-02-15 ENCOUNTER — RX RENEWAL (OUTPATIENT)
Age: 50
End: 2025-02-15

## 2025-02-17 ENCOUNTER — RX RENEWAL (OUTPATIENT)
Age: 50
End: 2025-02-17

## 2025-06-24 NOTE — PHYSICAL EXAM
[de-identified] : left thyroid nodule  [] : septum deviated bilaterally [Normal] : mucosa is normal [Midline] : trachea located in midline position [FreeTextEntry1] : Pleasant 64 year old WM  with structural heart disease presents for a follow up Cardiology evaluation due to chest pains and to review his recent lab results.

## 2025-08-19 ENCOUNTER — APPOINTMENT (OUTPATIENT)
Dept: OBGYN | Facility: CLINIC | Age: 50
End: 2025-08-19

## 2025-08-19 VITALS
OXYGEN SATURATION: 96 % | BODY MASS INDEX: 24.69 KG/M2 | DIASTOLIC BLOOD PRESSURE: 77 MMHG | WEIGHT: 135 LBS | SYSTOLIC BLOOD PRESSURE: 113 MMHG | HEART RATE: 104 BPM

## 2025-08-19 DIAGNOSIS — R23.2 FLUSHING: ICD-10-CM

## 2025-08-19 DIAGNOSIS — N95.1 MENOPAUSAL AND FEMALE CLIMACTERIC STATES: ICD-10-CM

## 2025-08-19 PROCEDURE — 99213 OFFICE O/P EST LOW 20 MIN: CPT

## 2025-08-25 PROBLEM — R23.2 HOT FLASHES: Status: ACTIVE | Noted: 2025-08-25

## 2025-08-25 PROBLEM — N95.1 PERIMENOPAUSE: Status: ACTIVE | Noted: 2025-08-25

## 2025-08-25 RX ORDER — ESTRADIOL 0.03 MG/D
0.03 PATCH TRANSDERMAL
Qty: 3 | Refills: 0 | Status: ACTIVE | COMMUNITY
Start: 2025-08-25 | End: 1900-01-01

## 2025-08-25 RX ORDER — PROGESTERONE 100 MG/1
100 CAPSULE ORAL
Qty: 3 | Refills: 0 | Status: ACTIVE | COMMUNITY
Start: 2025-08-25 | End: 1900-01-01

## 2025-08-28 ENCOUNTER — NON-APPOINTMENT (OUTPATIENT)
Age: 50
End: 2025-08-28